# Patient Record
Sex: MALE | Race: WHITE | NOT HISPANIC OR LATINO | Employment: OTHER
[De-identification: names, ages, dates, MRNs, and addresses within clinical notes are randomized per-mention and may not be internally consistent; named-entity substitution may affect disease eponyms.]

---

## 2020-06-30 ENCOUNTER — TRANSCRIBE ORDERS (OUTPATIENT)
Dept: OTHER | Age: 80
End: 2020-06-30

## 2020-06-30 DIAGNOSIS — L23.9 ALLERGIC CONTACT DERMATITIS: Primary | ICD-10-CM

## 2020-07-17 NOTE — TELEPHONE ENCOUNTER
FUTURE VISIT INFORMATION      FUTURE VISIT INFORMATION:    Date: 8.31.20    Time: 2 PM    Location: [facility] All[facility]  REFERRAL INFORMATION:    Referring provider:  Kun    Referring providers clinic:  [facility]    Reason for visit/diagnosis  Allergic contact dermatitis    RECORDS REQUESTED FROM:       Clinic name Comments Records Status Imaging Status   [facility] 6.30.20 Kun, -requested all related notes In process  Requested 7.17.20

## 2020-08-25 ENCOUNTER — TELEPHONE (OUTPATIENT)
Dept: ALLERGY | Facility: CLINIC | Age: 80
End: 2020-08-25

## 2020-08-25 NOTE — TELEPHONE ENCOUNTER
Called and left a detailed message on a private voicemail line to stop all antihistamines from today until appointment.   Anupama Kidd LPN

## 2020-08-31 ENCOUNTER — PRE VISIT (OUTPATIENT)
Dept: ALLERGY | Facility: CLINIC | Age: 80
End: 2020-08-31

## 2020-08-31 ENCOUNTER — OFFICE VISIT (OUTPATIENT)
Dept: ALLERGY | Facility: CLINIC | Age: 80
End: 2020-08-31
Attending: DERMATOLOGY
Payer: COMMERCIAL

## 2020-08-31 DIAGNOSIS — L20.81 ATOPIC NEURODERMATITIS: ICD-10-CM

## 2020-08-31 DIAGNOSIS — L56.8 PHOTODERMATITIS: ICD-10-CM

## 2020-08-31 DIAGNOSIS — L23.89 ALLERGIC CONTACT DERMATITIS DUE TO OTHER AGENTS: Primary | ICD-10-CM

## 2020-08-31 RX ORDER — POLYETHYLENE GLYCOL 3350 17 G/17G
17 POWDER, FOR SOLUTION ORAL
COMMUNITY
Start: 2020-08-24

## 2020-08-31 RX ORDER — CLOBETASOL PROPIONATE 0.5 MG/ML
SOLUTION TOPICAL
COMMUNITY
Start: 2020-04-22

## 2020-08-31 RX ORDER — METOPROLOL SUCCINATE 50 MG/1
50 TABLET, EXTENDED RELEASE ORAL
COMMUNITY
Start: 2020-08-24

## 2020-08-31 RX ORDER — CALCIUM POLYCARBOPHIL 625 MG 625 MG/1
2 TABLET ORAL 2 TIMES DAILY
COMMUNITY

## 2020-08-31 RX ORDER — TERAZOSIN 5 MG/1
10 CAPSULE ORAL
COMMUNITY

## 2020-08-31 RX ORDER — BETAMETHASONE DIPROPIONATE 0.5 MG/G
CREAM TOPICAL
COMMUNITY
Start: 2020-05-05

## 2020-08-31 RX ORDER — PRAVASTATIN SODIUM 10 MG
10 TABLET ORAL
COMMUNITY
Start: 2020-08-24

## 2020-08-31 ASSESSMENT — PAIN SCALES - GENERAL: PAINLEVEL: NO PAIN (0)

## 2020-08-31 NOTE — NURSING NOTE
Chief Complaint   Patient presents with     Allergy Consult     Referred by Flory Tristan for contact dermatitis.     Anupama Kidd LPN

## 2020-08-31 NOTE — PROGRESS NOTES
[facility] Dermato-allergology note      Allergy Problem List:    Specialty Problems     None          CC:   Allergy Consult (Referred by Flory Tristan for contact dermatitis.)        Encounter Date: Aug 31, 2020    History of Present Illness:  I have reviewed the existing informations with patient personally, in Epic and other sources including the nursing intake corresponding to this issue. 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 Consulted Oncologist in [place] and there all normal. Then went to Dr. Tristan and again biopsies = not lymphoma, but one time spongiotic dermatitis.    Tried different creams, but not really improvement = has to use topical steroids daily    Patient has also chest pain and bloating --> cut out alcohol about 6 weeks ago and skin improved    Couple weeks ago back to [place] for about 1 week and 4 days later eczema started again.    Used some time ago Neosporin    Patient has since age of 52 in spring and fall rhinosinusitis.    Medications: Metoprolol, Terazosin, Pravastatin, Istolol eye drops (Glaucoma), Fiber supplement and Mirolax (Diverticulitis),   Rarely takes Ibuprofen or Tylenol    Patient since about 1 month on Tacrolimus and has some effects, but not very good.    Patient got Amoxicillin for the dermatitis in June 2020 and after 2-3 days lips were swelling up. Maybe it was not Amoxicillin, patient takes amoxicillin regularly before going to dentist and had never  problems. Find out exaclty what medication was given in June and maybe remove Amoxicillin from Allergy list.        Past Medical History:   There is no problem list on file for this patient.    No past medical history on file.    Allergy History:     Allergies   Allergen Reactions     Amoxicillin Other (See Comments)     Lips swelling      Brimonidine Itching     Rosuvastatin Muscle Pain (Myalgia)     Timolol Itching       Social History:  The patient is retired. Patient has the following hobbies or non-occupational exposure      reports that he quit smoking about 30 years ago. He does not have any smokeless tobacco history on file.      Family History:  No family history on file.    Medications:  Current Outpatient Medications   Medication Sig Dispense Refill     betamethasone dipropionate (DIPROSONE) 0.05 % external cream APPLY TWICE DAILY TO ALL ITCHY SPOTS.       calcium polycarbophil (FIBERCON) 625 MG tablet Take 2 tablets by mouth 2 times daily       clobetasol (TEMOVATE) 0.05 % external solution APPLY TO SCALP 1 2 TIMES A DAY FOR ITCHY SKIN.       metoprolol succinate ER (TOPROL-XL) 50 MG 24 hr tablet Take 50 mg by mouth       polyethylene glycol (MIRALAX) 17 GM/Dose powder Take 17 g by mouth       pravastatin (PRAVACHOL) 10 MG tablet Take 10 mg by mouth       terazosin (HYTRIN) 5 MG capsule Take 10 mg by mouth             Review of Systems:  -As per HPI  -Constitutional: The patient denies fatigue, fevers, chills, unintended weight loss, and night sweats.  -HEENT: Patient denies nonhealing oral sores.  -Skin: As above in HPI. No additional skin concerns.    Physical exam:  Vitals: There were no vitals taken for this visit.  GEN: This is a well developed, well-nourished male in no acute distress, in a pleasant mood.    Skin: Focused examination of the entire skin within the consultation was performed.   - no eczemas on legs, but over the volar side of the forearms some subacute dermatitis with prurigo-like  nodules.   - subacute dermatitis also decolletage, slight erythema on face  - no eczema on trunk   --> somehow airborne or photodistribution!  Other symptoms related to allergy    -No other lesions of concern on areas examined      Allergy Tests:    Past Allergy Test    Future Allergy Test: 8/31/2020     Order for PATCH TESTS    [] Outpatient  [] Inpatient: [facility]..../ Bed ....      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 DDx allergic contact dermatitis vs atopic dermatitis (nummular dermatitis)  Known previous allergies: none    Standardized panels  [x] Standard panel (40 tests)  [x] Preservatives & Antimicrobials (31 tests)  [x] Emulsifiers & Additives (25 tests)   [x] Perfumes/Flavours & Plants (25 tests)  [] Hairdresser panel (12 tests)  [] Rubber Chemicals (22 tests)  [] Plastics (26 tests)  [] Colorants/Dyes/Food additives (20 tests)  [] Metals (implants/dental) (24 tests)  [] Local anaesthetics/NSAIDs (13 tests)  [] Antibiotics & Antimycotics (14 tests)   [x] Corticosteroids (15 tests)   [x] Photopatch test (62 tests)   [] others: ...      [] Patient's own products: ...    DO NOT test if chemical or biological identity is unknown!     always ask from patient the product information and safety sheets (MSDS)   [x] Atopy screen prick test (Atopic predisposition): with IDT HDM and molds if prick negative         Impression/Plan:    # recurrent dermatitis mostly exposed areas DDx atopic dermatitis with nummular dermatitis vs allergic contact dermatitis vs photoallergic dermatitis    # excluding drug allergy with  lip swelling to Amoxicillin  --> unlikely, because not first day of treatment  --> patient had several treatments with Amoxicillin before without problems?!  --> other medication?? Patient will bring the original medication box      Patient counseling:  Explained the patch tests      Follow-up:  For patch tests    I spent a total of 25 minutes face to face with Freddy Day during today s office visit. Over 50% of this time was spent counseling the patient and/or coordinating care.  Please see Assessment and Plan for details.      Thank you for the opportunity be involved in the care of this patient.     Staff: Anupama Kidd LPN

## 2020-08-31 NOTE — PATIENT INSTRUCTIONS
WHAT IS A SKIN PRICK TEST?   A skin prick test is a simple and reliable diagnostic test used to identify substances ( allergens ) responsible for triggering the symptoms of allergy. The basic skin prick testing panel includes common allergens, such as house dust mites, molds, dog and cat allergens and pollen allergens. We have other more specialized series for various food allergens and sometimes we test your own foods directly on your skin as well. Tests will usually be performed on the skin of the forearm. The skin may develop a red and itchy reaction which can be an indication of an allergy to the tested substance, but will be confirmed by discussion with the allergy specialist     HOW IS A SKIN PRICK TEST PERFORMED?   The skin will be disinfected with alcohol, then marked and numbered with a pen to identify the areas where the specific allergens will be tested.   Afterwards a drop of the allergen solution will be placed on the skin and then the skin gently pricked using the tip of a specially designed prick needle.   With this procedure the most superficial part of the skin will be pierced to allow the test solution to diffuse into the skin and make contact with the reactive immune cells.   After 20 min the area will be examined and evaluated for possible allergic reactions.     WHAT ARE THE POSSIBLE RISKS OF A SKIN PRICK TEST?   If the skin reacts it will develop red, itchy wheals of 5-30mm diameter.   The wheal and itch will usually disappear spontaneously after 1-2 hours.   Sometimes there might be a delayed reactions after days and this has to be reported to the treating allergy specialist (could be another kind of reaction pattern and important piece of information).   Rarely there are more serious generalized allergic reactions observed and therefore you will be under observation of the allergy team during the entire procedure.   There is a small risk for some bleeding and skin infection by the  SPT.    WHAT DO I NEED TO DO BEFORE SKIN PRICK TEST?  Stop all antihistamines for at lease 1 week.  Stop all oral steroids at lease 1 month prior to testing.   Patch Testing Information  What are allergen patch tests?    The test is done to look for skin allergies that may be causing rashes and irritation.    A patch test is a way of identifying whether a substance has caused a delayed reaction with skin inflammation, such as contact eczema or delayed (after days) reactions to drugs.     We will use various types of test compounds, which may include common allergens you may come in contact with in daily life such as preservatives, fragrances or even drugs.     Most of the time we will use standardized, prefabricated test solutions. The choice of the substances and series tested will depend on your history of reactions. Sometimes we will test your own products as well.     In order to avoid severe toxic reactions we need detailed information or safety sheets for each of the test compounds.    The test panels are set up with a small amount of common substances that cause skin allergies. They are taped to your skin with a clear hypoallergenic bandage and reinforced hypoallergenic tape.    The substances are numbered, so it is easy to tell what is causing a skin reaction.  What do I need to do in preparation for the test?    Stop all systemic steroids 1 month prior to the testing.     Stop applying topical steroids to the test area one week prior to the test. It is to use them elsewhere throughout the testing process. If this is not possible then discuss options with the Allergy specialist.    Do not go sunbathing or tanning for one week prior to testing    It is okay to take antihistamines as normal.     Please wear dark colors the week of the test since we will write on you with a dark marker that may transfer and stain clothing or bedding.     Some medications can affect the reactions to allergens during the tests.  Therefore reveal all the medications you take to the allergy team. The doctor will discuss the medications with you before the tests.     Eat how you normally would.    Avoid the following:    You cannot get the test area wet during the entire test period. This means no bathing or swimming the entire test period.    No strenuous exercise that may cause sweating.    Do not scratch the test area, this can cause the allergen to spread and give us false positives.     Avoid exposure to UV irradiation. This means no tanning or UV treatment during the testing period.   What can I expect?    Patch testing is done over three appointments.   o The usual schedule is: Monday (Allergen patches are placed), Wednesday (Patches are removed and skin is examined by the MD), and Friday (Skin is examined by the MD)      If you are allergic, there will be an area of irritation where the test was placed.    Itching or burning at the test site might happen if you are allergic to the allergen.  Do not rub or scratch the test site since this may spread the allergen and possibly cause false positives. If itching or burning is not tolerable please contact the clinic.    The marker we draw on your back with ma take up to 5 weeks to wash off completely. Rubbing alcohol can help speed up this process.     Reactions can occur 1 to 2 weeks after the tests are applied. If this happens please take photos of the area and contact the clinic.  What should I do after the tests are placed?    Keep the area dry. No showering or getting the test area wet from the time we see you at your first visit until after your third appointment. If you get the test area wet you are washing off the test and we could get false negative reactions.    If you notice any of the test patches coming loose put on more tape to re-secure the test.    If the marker that is applied fades you can use a dark pen to mele around the panel sites.    Cover the test area when you are  outside to avoid any sun exposure while the test is in place.     You can remove the tests only if there is a severe reaction (itch, pain, blistering). Please report this to your doctor immediately. If you have to remove the tests please mele the locations of each test field with a grid so we can identify the allergen.  WHAT ARE THE POSSIBLE RISKS OF PATCH TESTS     If you are allergic to a compound tested you will develop a localized skin reaction similar to your previous reaction, this may take days to develop. These reactions include a formation of red, itchy skin lesions that could be about a centimeter with small vesicles or possibly even blisters. The lesions will fade over time, this may take weeks. The test might leave some skin pigmentation for a few months.     In rare cases a localized reaction to patch testing can become generalized.     The tests with your own products might have some risks because they are not standardized and unanticipated reactions could occur. We need as much information as possible to evaluate if your own products are safe to test and under what conditions. This has to be evaluated for each individual product.   Useful Contact Information    To contact your doctor you can either send a Addepar message or call [phone]    Address  o [address] [place] [address], [address]    If you develop any serious or adverse allergic reaction after office hours please seek immediate medical assistance at the nearest clinic, urgent care, or emergency room.

## 2020-10-06 ENCOUNTER — TELEPHONE (OUTPATIENT)
Dept: ALLERGY | Facility: CLINIC | Age: 80
End: 2020-10-06

## 2020-10-06 NOTE — TELEPHONE ENCOUNTER
Referral information     Ordering provider: Kun tesafye    Provider requested:      Reason for referral: patch consult

## 2020-10-13 ENCOUNTER — TELEPHONE (OUTPATIENT)
Dept: ALLERGY | Facility: CLINIC | Age: 80
End: 2020-10-13

## 2020-10-13 NOTE — TELEPHONE ENCOUNTER
STANDARD Series         No Substance 2 days 4 days remarks   1 Juaquin Mix [C] - -    2 Colophony - -    3  2-Mercaptobenzothiazole  - -     4 Methylisothiazolinone - -    5 Carba Mix - -    6 Thiuram Mix [A] - -    7 Bisphenol A Epoxy Resin - -    8 Q-Sztw-Jtfjvhwvvuz-Formaldehyde Resin - -    9 Mercapto Mix [A] - -    10 Black Rubber Mix- PPD [B] - -    11 Potassium Dichromate  -  -    12 Balsam of [place] (Myroxylon Pereirae Resin) - -    13 Nickel Sulphate Hexahydrate - -    14 Mixed Dialkyl Thiourea - -    15 Paraben Mix [B] - -    16 Methyldibromo Glutaronitrile - -    17 Fragrance Mix - -    18 2-Bromo-2-Nitropropane-1,3-Diol (Bronopol) - -    19 Lyral - -    20 Tixocortol-21- Pivalate - -    21 Diazolidiyl Urea (Germall II) - -    22 Methyl Methacrylate - -    23 Cobalt (II) Chloride Hexahydrate - -    24 Fragrance Mix II  - -    25 Compositae Mix - -    26 Benzoyl Peroxide - -    27 Bacitracin - -    28 Formaldehyde - -    29 Methylchloroisothiazolinone / Methylisothiazolinone - -    30 Corticosteroid Mix - -    31 Sodium Lauryl Sulfate - -    32 Lanolin Alcohol - -    33 Turpentine - -    34 Cetylstearylalcohol - -    35 Chlorhexidine Dicluconate - -    36 Budenoside - -    37 Imidazolidinyl Urea  - -    38 Ethyl-2 Cyanoacrylate - -    39 Quaternium 15 (Dowicil 200) - -    40 Decyl Glucoside - -      PERFUMES, FLAVORS & PLANTS          No Substance 2 days 4 days remarks   41 1 Benzyl Salicylate - -    42 2 Benzyl Cinnamate - -    43 3 Di-Limonene (Dipentene) - -    44 4 Cananga Odorata (Ozzy Preciado) (I) - -    45 5 Lichen Acid Mix - -    46 6 Mentha Piperita Oil (Peppermint Oil) - -    47 7 Sesquiterpenelactone mix - -    48 8 Tea Tree Oil, Oxidized - -    49 9 Wood Tar Mix - -    50 10 Abietic Acid - -    51 11 Lavendula Angustifolia Oil (Lavender Oil) - -    52 12 Camphor  - -      Fragrance Mix I      53 13 Oakmoss Absolute - -    54 14 Eugenol - -    55 15 Geraniol - -    56 16 Hydroxycitronellal - -    57  17 Isoeugenol - -    58 18 Cinnamic Aldehyde - -    59 19 Cinnamic Alcohol  - -    60 20 Sorbitane Sesquioleate - -      Fragrance mix II      61 21 Citronellol - -    62 22 Alpha-Hexylcinnamic Aldehyde    - -    63 23 Citral - -    64 24 Farnesol - -    65 25 Coumarin - -      CORTICOSTEROIDS     No Substance 2 days 4 days remarks Allergy  class   66 1 Amcinonide - -  B   67 2 Betametasone-17,21 Dipropionate - -  D1   68 3 Desoximetasone - -  C   69 4 Betamethasone-17-Valerate - -  D1   70 5 Dexamethasone - -  C   71 6 Hydrocortisone - -  A   72 7 Clobetasol-17-Propionate - -  D1   73 8 Dexamethasone-21-Phosphate Disodium Salt - -  C   74 9 Hydrocortisone-17 Butyrate - -  D2   75 10 Prednisolone - -  A   76 11 Mometason Furoate - -  D1   77 12 Triamcinolone Acetonide - -  B   78 13 Methylprednisolone Aceponate - -  D2   79 14 Hydrocortisone-21-Acetate - -  A   80 15 Prednicarbate - -  D2     Group Characteristics of group Generic name Name  cross reactions   A Hydrocortisone   Cloprednole, Fludrocortisone acétate, Hydrocortison acetate, Methylprednisolone, Prednisolone, Tixocortolpivalate Alfacortone, Fucidin H, Dermacalm, Hexacortone, Premandole, Imacort With group D2   B Triamcinolone-acetonide   Budenoside (R-isomer), Amcinonide, Desonide, Fluocinolone acetonide, Triamcinolone acetonide Locapred, Locatop  Synalar, Pevisone, Ken[place]ort -   C Betamethasone (Without Dee)   Betamethasone, Dexamethasone, Flumethasone pivalate, Halomethasone Daivobet, Dexasalyl, Locasalen,   -   D1 Betamethasone-diproprionate   Betamethasone dipropionate, Betamethasone-17-valerate, Clobetasole-propionate, Fluticasone propionate, Mometasone furoate Betnovate, Diprogenta, Diprosalic, Diprosone, Celestoderm, Fucicort,  Cutivate, Axotide, Elocom -   D2 Methylprednisolone-aceponate   Hydrocortisone-aceponate, Hydrocortisone-buteprate, Hydrocortisone-17-butyrate, Methylprednisolone aceponate, Prednicarbate Locoïd, Advantan,  Prednitop With  group A and Budesonide (S-isomer)     EMULSIFIERS & ADDITIVES        No Substance 2 days 4 days remarks   81 1 Polyethylene Glycol-400 - -    82 2 Cocamidopropyl Betaine - -    83 3 Amerchol L101 - -    84 4 Propylene Glycol - -    85 5 Triethanolamine - -    86 6 Sorbitane Sesquiolate - -    87 7 Isopropylmyristate - -    88 8 Polysorbate 80  - -    89 9 Amidoamine   (Stearamidopropyl Dimethylamine) - -    90 10 Oleamidopropyl Dimethylamine - -    91 11 Lauryl Glucoside - -    92 12 Coconut Diethanolamide  - -    93 13 2-Hydroxy-4-Methoxy Benzophenone (Oxybenzone) - -    94 14 Benzophenone-4 (Sulisobenzon) - -    95 15 Propolis - -    96 16 Dexpanthenol - -    97 17 Carboxymethyl Cellulose Sodium - -    98 18 Abitol - -    99 19 Tert-Butylhydroquinone - -    100 20 Benzyl Salicylate - -      Antioxidant      101 21 Dodecyl Gallate - -    102 22 Butylhydroxyanisole (BHA) - -    103 23 Butylhydroxytoluene (BHT) - -    104 24 Di-Alpha-Tocopherol (Vit E) - -    105 25 Propyl Gallate - -    106 26 Zinc Pyrithione      107 27 Dimethylaminopropylamin (DMAPA)          PRESERVATIVES & ANTIMICROBIALS          No Substance 2 days 4 days remarks   108 1  1,2-Benzisothiazoline-3-One, Sodium Salt - -    109 2  1,3,5-Mk (2-Hydroxyethyl) - Hexahydrotriazine (Grotan BK) - -    110 3 7-Esnwrivxrsiqn-7-Nitro-1, 3-Propanediol - -    111 4  3, 4, 4' - Triclocarban - -    112 5 4 - Chloro - 3 - Cresol - -    113 6 4 - Chloro - 4 - Xylenol (PCMX) - -    114 7 7-Ethylbicyclooxazolidine (Bioban TB4809) - -    115 8 Benzalkonium Chloride - -    116 9 Benzyl Alcohol - -    117 10 Cetalkonium Chloride - -    118 11 Cetylpyrimidine Chloride  - -    119 12 Chloroacetamide - -    120 13 DMDM Hydantoin - -    121 14 Glutaraldehyde - -    122 15 Triclosan - -    123 16 Glyoxal Trimeric Dihydrate - -    124 17 Iodopropynyl Butylcarbamate - -    125 18 Octylisothiazoline - -    126 19 Iodoform - -    127 20 (Nitrobutyl)  Morpholine/(Ethylnitro-Trimethylene) Dimorpholine (Bioban P 1487) - -    128 21 Phenoxyethanol - -    129 22 Phenyl Salicylate - -    130 23 Povidone Iodine - -    131 24 Sodium Benzoate - -    132 25 Sodium Disulfite - -    133 26 Sorbic Acid - -    134 27 Thimerosal - -    135 28 Melamine Formeladyde Resin        Parabens      136 29 Butyl-P-Hydroxybenzoate - -    137 30 Ethyl-P-Hydroxybenzoate - -    138 31 Methyl-P-Hydroxybenzoate - -    139 32 Propyl-P-Hydroxybenzoate - -      PHOTOPATCH tests (unexposed)          No Substance 2 days 4 days remarks   140 1 3,4,4'-Triclocarban - -    141 2 Coumarin - -    142 3 Bithiniol - -    143 4 Usnic Acid - -    144 5 Compositae Mix - -    145 6 Wood Tar Mix - -    146 7 Balsam of [place] (Myroxylon Pereirae Resin) - -    147 8 Hexachlorophene - -    148 9 Chlorhexidine Digluconate - -    149 10 Triclosan - -    150 11 Fragrance Mix - -    151 12 Ketoprofen  - -    152 13 Lichen Acid Mix - -    153 14 Musk Ambrette - -      Sunscreens (UV filters)  -    154 15 P-Aminobenzoic Acid - -    155 16 2-Hydroxy-4-Methoxy Benzophenone (Oxybenzone) - -    156 17 Tert-Butyl-Methoxydibenzoyl Methane  - -    157 18 3-4-Mehyl Benzyliden Camphor  - -    158 19 2-Ethylhexyl-P (Dimethylamino) Benzoate - -    159 20 6-Nlczrtpe-3-Methoxy-4-Methyl Benzophenone - -    160 21 Benzyl Salicylate - -    161 22 Isoamyl-4-Methoxycinnamate - -    162 23 Phenyl Benzimidazole - 5 - Sulfonic Acid - -    163 24 7-Asufzagvftoht-7-Hydroxybenzoyl-Benzoic Acid Hexyl Dee - -    164 25 Menthyl Anthranilate (Meradimate) - -    165 26 Bis-Ethylhexyloxyphenol-Methoxy Phenyl Triazine - -    166 27 Diethylhexyl Butamido Triazone - -    167 28 Disodium Phenyldibenzimidazole Tetrasulfonate - -    168 29 Octocrylene - -    169 30 Octyl Triazone - -    170 31 Tinsorb (Methylene - Bis - Benzotriazyl Tetramethylbutylphenol) - -      OTHER PRODUCTS          No Substance Conc  % solv 2 days 4 days remarks   171 1          172 2         172 3         174 4         175 5         176 6         177 7         178 8         179 9         180 10           PHOTOPATCH tests (exposed)          No Substance 2 days 4 days remarks   181 1 3,4,4'-Triclocarban - -    182 2 Coumarin - -    183 3 Bithiniol - -    184 4 Usnic Acid - -    185 5 Compositae Mix - -    186 6 Wood Tar Mix - -    187 7 Balsam of [place] (Myroxylon Pereirae Resin) - -    188 8 Hexachlorophene - -    189 9 Chlorhexidine Digluconate - -    190 10 Triclosan - -    191 11 Fragrance Mix - -    192 12 Ketoprofen  - -    193 13 Lichen Acid Mix - -    194 14 Musk Ambrette - -      Sunscreens (UV filters)  -    195 15 P-Aminobenzoic Acid - -    196 16 2-Hydroxy-4-Methoxy Benzophenone (Oxybenzone) - -    197 17 Tert-Butyl-Methoxydibenzoyl Methane  - -    198 18 3-4-Mehyl Benzyliden Camphor  - -    199 19 2-Ethylhexyl-P (Dimethylamino) Benzoate - -    200 20 7-Cvebwnzc-6-Methoxy-4-Methyl Benzophenone - -    201 21 Benzyl Salicylate - -    202 22 Isoamyl-4-Methoxycinnamate - -    203 23 Phenyl Benzimidazole - 5 - Sulfonic Acid - -    204 24 5-Vrsfslvzxewfe-8-Hydroxybenzoyl-Benzoic Acid Hexyl Dee - -    205 25 Menthyl Anthranilate (Meradimate) - -    206 26 Bis-Ethylhexyloxyphenol-Methoxy Phenyl Triazine - -    207 27 Diethylhexyl Butamido Triazone - -    208 28 Disodium Phenyldibenzimidazole Tetrasulfonate - -    209 29 Octocrylene - -    210 30 Octyl Triazone - -    211 31 Tinsorb (Methylene - Bis - Benzotriazyl Tetramethylbutylphenol) - -      Procedure: Apply the same photopatch series 2  times on the back. Remove one patch side for irradiation after 1 day and then irradiate this site with 10 J/cm2 UVA. Remove the other, non-irradiated patch sites on day 2.   Maybe perform on these patients as well standardized UV-B and UV-A MED tests.    OTHER PRODUCTS (Exposed)          Substance Conc  % solv 2 days 4 days remarks   212         213         214         215         216         217    "      218         219         220           Patients own products:  è DO NOT test if chemical or biological identity is unknown!   - always ask from patient the product information and safety sheets and consult with the physician   - check neutral pH with pH indicator paper (do not test pH below 5 or over 8 or consult with physician)  - leave-on cosmetics can be tested \"as is\"  - rinse-off products have to be diluted with water, buffer, olive oil or paraffin (discuss with physician)  à remember:   - non-specific toxic/corrosive or biological reactions can occur  - tests with patients own products are not standardized and test conditions are not optimized for patch tests. Whenever possible test with standardized test series and correlate use of product with the result of the patch tests  - if not sure if compounds can be tested under occlusion in patch tests consider open application tests    Results of patch tests:                         Interpretation:    - Negative                    A    = Allergic      (+) Erythema    TI   = Toxic/irritant   + E + Infiltration    RaP = Relevance at Present     ++ E/I + Papulovesicle   Rpr  = Relevance Previously     +++ E/I/P + Blister     nR   = No Relevance          Atopy Screen    No Substance Readings (15 min) Evaluation   POS Histamine 1mg/ml -    NEG NaCl 0.9% -      No Substance Readings (15 min) Evaluation   1 Alternaria alternata (tenuis)  -    2 Cladosporium herbarum -    3 Aspergillus fumigatus -    4 Penicillium notatum -    5 Dermatophagoides pteronyssinus -    6 Dermatophagoides farinae -    7 Dog epithelium (canis spp) -    8 Cat hair (alyssa catus) -    9 Cockroach   (Blatella americana & germanica) -    10 Grass mix midwest   (Nano, Orchard, Redtop, Rickie) -    11 Kenrick grass (sorghum halepense) -    12 Weed mix   (common Cocklebur, Lamb s quarters, rough redroot Pigweed, Dock/Sorrel) -    13 Mug wort (artemisia vulgare) -    14 Ragweed giant/short (ambrosia " spp) -    15 English Plantain (plantago lanceolata) -    16 Tree mix 1 (Pecan, Maple BHR, Oak RVW, american [place], black Sharon) -    17 Red cedar (juniperus virginia) -    18 Tree mix 2   (white Tashi, river/red Birch, black Felt, common [place], american Elm) -    19 Box elder/Maple mix (acer spp) -    20 [facility] (carya ovata) -       -      Conclusion:

## 2020-10-16 ENCOUNTER — TELEPHONE (OUTPATIENT)
Dept: DERMATOLOGY | Facility: CLINIC | Age: 80
End: 2020-10-16

## 2020-10-16 NOTE — TELEPHONE ENCOUNTER
"Patient called and was going over pre patch testing info and noticed that he is not suppose to use steroid creams one week before appointment. Patient last used Triamcinolone on 10/15 on upper right corner of back, patient says 3 quarters of his back is \"untouched\". Patient wondering if he should reschedule?     I will forward to Dr. Arevalo and Beverly FORRESTER CMA    "

## 2020-10-19 ENCOUNTER — OFFICE VISIT (OUTPATIENT)
Dept: ALLERGY | Facility: CLINIC | Age: 80
End: 2020-10-19
Payer: COMMERCIAL

## 2020-10-19 DIAGNOSIS — L20.81 ATOPIC NEURODERMATITIS: ICD-10-CM

## 2020-10-19 DIAGNOSIS — L56.8 PHOTODERMATITIS: Primary | ICD-10-CM

## 2020-10-19 DIAGNOSIS — L23.89 ALLERGIC CONTACT DERMATITIS DUE TO OTHER AGENTS: ICD-10-CM

## 2020-10-19 PROCEDURE — 95044 PATCH/APPLICATION TESTS: CPT | Mod: 59 | Performed by: DERMATOLOGY

## 2020-10-19 PROCEDURE — 95052 PHOTO PATCH TESTS: CPT | Performed by: DERMATOLOGY

## 2020-10-19 PROCEDURE — 95004 PERQ TESTS W/ALRGNC XTRCS: CPT | Performed by: DERMATOLOGY

## 2020-10-19 PROCEDURE — 95024 IQ TESTS W/ALLERGENIC XTRCS: CPT | Performed by: DERMATOLOGY

## 2020-10-19 PROCEDURE — 96912 PHOTOCHEMOTHERAPY PUVA: CPT | Performed by: DERMATOLOGY

## 2020-10-19 ASSESSMENT — PAIN SCALES - GENERAL: PAINLEVEL: NO PAIN (0)

## 2020-10-19 NOTE — PROGRESS NOTES
Patient had 165 patch tests placed this visit.  Patient had 30 photo patch tests placed this visit.     Standard panel (40 tests)    Preservatives & Antimicrobials (31 tests)    Emulsifiers & Additives (25 tests)     Perfumes/Flavours & Plants (24 tests)    Corticosteroids (15 tests)     Unexposed Photopatch test (30 tests)     Exposed Photopatch tests (30 tests)

## 2020-10-19 NOTE — PROGRESS NOTES
Note for return visit for Dermato-Allergy       Encounter Date: Oct 19, 2020    History of Present Illness:  Freddy Day is a 80 year old male who presents in person to the consult following information has been take directly from the prior notes, patients informations, Epic and/or other sources and exam/history performed by myself:       Additional comments and observations from review of the patient s chart including the following:    See notes for more details    ROS: Patient generally feeling well today   Physical Examination:  General: Well-appearing, appropriately-developed individual.  - Skin: Focused examination of the skin on trunk and extremities within the consultation was performed.   Generally very dry skin with nummular eczematous lesions on extremities and on neck very dry, almost lichenified skin  On the back some localized eczematous lesions  As above in HPI. No additional skin concerns.  - upper respiratory tract: currently no obvious Rhinitis  - lungs: no signs for active and present Asthma/Wheezing or coughing  - eyes: currently no active conjunctivits  - GI system/digestion: currently no problems, no bloating or Diarrhoea        Earlier History and Allergy exams:    2.5 years ago patient was on a party in [place] and got bitten on the ankle and then the rash did not disappear and then spread over the rest of legs ==> diagnosed as nummular eczema (got steroid injection and topical triamcinolone and within 3months gone)    Last December again in [place] and again insect bite on ankles and then spread over legs, arms, back/chest and face ==> dermatologist in [place] with 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 Consulted Oncologist in [place] and there all normal. Then went to Dr. Tristan and again biopsies = not lymphoma, but one time spongiotic dermatitis.    Tried different creams, but not really improvement = has to use topical steroids daily    Patient has also chest pain and  bloating --> cut out alcohol about 6 weeks ago and skin improved    Couple weeks ago back to [place] for about 1 week and 4 days later eczema started again.    Used some time ago Neosporin    Patient has since age of 52 in spring and fall rhinosinusitis.    Medications: Metoprolol, Terazosin, Pravastatin, Istolol eye drops (Glaucoma), Fiber supplement and Mirolax (Diverticulitis),   Rarely takes Ibuprofen or Tylenol    Patient since about 1 month on Tacrolimus and has some effects, but not very good.    Patient got Amoxicillin for the dermatitis in June 2020 and after 2-3 days lips were swelling up. Maybe it was not Amoxicillin, patient takes amoxicillin regularly before going to dentist and had never problems. Find out exaclty what medication was given in June and maybe remove Amoxicillin from Allergy list.    Past Medical History:   There is no problem list on file for this patient.    No past medical history on file.    Allergy History:     Allergies   Allergen Reactions     Amoxicillin Other (See Comments)     Lips swelling      Brimonidine Itching     Rosuvastatin Muscle Pain (Myalgia)     Timolol Itching       Social History:  The patient is retired. Patient has the following hobbies or non-occupational exposure      reports that he quit smoking about 30 years ago. He does not have any smokeless tobacco history on file.      Family History:  No family history on file.    Medications:  Current Outpatient Medications   Medication Sig Dispense Refill     betamethasone dipropionate (DIPROSONE) 0.05 % external cream APPLY TWICE DAILY TO ALL ITCHY SPOTS.       calcium polycarbophil (FIBERCON) 625 MG tablet Take 2 tablets by mouth 2 times daily       clobetasol (TEMOVATE) 0.05 % external solution APPLY TO SCALP 1 2 TIMES A DAY FOR ITCHY SKIN.       metoprolol succinate ER (TOPROL-XL) 50 MG 24 hr tablet Take 50 mg by mouth       polyethylene glycol (MIRALAX) 17 GM/Dose powder Take 17 g by mouth       pravastatin  (PRAVACHOL) 10 MG tablet Take 10 mg by mouth       terazosin (HYTRIN) 5 MG capsule Take 10 mg by mouth         Allergy Tests:    Past Allergy Test    Future Allergy Test: 8/31/2020     Order for PATCH TESTS    [] Outpatient  [] Inpatient: [facility]..../ Bed ....      Skin Atopy (atopic dermatitis) [x] Yes   [] No  Rhinitis/Sinusitis:   [x] Yes   [] No (spring and fall) --> Sinusitis and Postnasal drip entire year. 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 DDx allergic contact dermatitis vs atopic dermatitis (nummular dermatitis)  Known previous allergies: none    Standardized panels  [x] Standard panel (40 tests)  [x] Preservatives & Antimicrobials (31 tests)  [x] Emulsifiers & Additives (25 tests)   [x] Perfumes/Flavours & Plants (25 tests)  [] Hairdresser panel (12 tests)  [] Rubber Chemicals (22 tests)  [] Plastics (26 tests)  [] Colorants/Dyes/Food additives (20 tests)  [] Metals (implants/dental) (24 tests)  [] Local anaesthetics/NSAIDs (13 tests)  [] Antibiotics & Antimycotics (14 tests)   [x] Corticosteroids (15 tests)   [x] Photopatch test (62 tests)   [] others: ...      [] Patient's own products: ...    DO NOT test if chemical or biological identity is unknown!     always ask from patient the product information and safety sheets (MSDS)   [x] Atopy screen prick test (Atopic predisposition): with IDT HDM and molds if prick negative    RESULTS & EVALUATION of PATCH TESTS    Patch test readings after     [x] 2 days, [] 3 days [x] 4 days, [] 5 days,   Other duration: ...    Applied patch tests with results (import here the list of patch tests):     STANDARD Series         No  Substance 2 days 4 days remarks   1 Juaquin Mix [C] - -    2 Colophony - -    3  2-Mercaptobenzothiazole  - -     4 Methylisothiazolinone - -    5 Carba Mix - -    6 Thiuram Mix [A] - -    7 Bisphenol A Epoxy Resin - -    8 O-Nmcr-Wgvonjpsjzu-Formaldehyde Resin - -    9 Mercapto Mix [A] - -    10 Black Rubber Mix- PPD [B] - -    11 Potassium Dichromate  -  -    12 Balsam of [place] (Myroxylon Pereirae Resin) - -    13 Nickel Sulphate Hexahydrate - -    14 Mixed Dialkyl Thiourea - -    15 Paraben Mix [B] - -    16 Methyldibromo Glutaronitrile - -    17 Fragrance Mix - -    18 2-Bromo-2-Nitropropane-1,3-Diol (Bronopol) - -    19 Lyral - -    20 Tixocortol-21- Pivalate - -    21 Diazolidiyl Urea (Germall II) - -    22 Methyl Methacrylate - -    23 Cobalt (II) Chloride Hexahydrate - -    24 Fragrance Mix II  - -    25 Compositae Mix - -    26 Benzoyl Peroxide - -    27 Bacitracin - -    28 Formaldehyde - -    29 Methylchloroisothiazolinone / Methylisothiazolinone - -    30 Corticosteroid Mix - -    31 Sodium Lauryl Sulfate - -    32 Lanolin Alcohol - -    33 Turpentine - -    34 Cetylstearylalcohol - -    35 Chlorhexidine Dicluconate - -    36 Budenoside - -    37 Imidazolidinyl Urea  - -    38 Ethyl-2 Cyanoacrylate - -    39 Quaternium 15 (Dowicil 200) - -    40 Decyl Glucoside - -      PERFUMES, FLAVORS & PLANTS          No Substance 2 days 4 days remarks   41 1 Benzyl Salicylate - -    42 2 Benzyl Cinnamate - -    43 3 Di-Limonene (Dipentene) - -    44 4 Cananga Odorata (Ylang Ylang) (I) - -    45 5 Lichen Acid Mix - -    46 6 Mentha Piperita Oil (Peppermint Oil) - -    47 7 Sesquiterpenelactone mix - -    48 8 Tea Tree Oil, Oxidized - -    49 9 Wood Tar Mix - -    50 10 Abietic Acid - -    51 11 Lavendula Angustifolia Oil (Lavender Oil) - -    52 12 Camphor  NA NA      Fragrance Mix I      53 13 Oakmoss Absolute - -    54 14 Eugenol - -    55 15 Geraniol - -    56 16 Hydroxycitronellal - -    57 17 Isoeugenol - -    58  18 Cinnamic Aldehyde - -    59 19 Cinnamic Alcohol  - -    60 20 Sorbitane Sesquioleate - -      Fragrance mix II      61 21 Citronellol - -    62 22 Alpha-Hexylcinnamic Aldehyde    - -    63 23 Citral - -    64 24 Farnesol - -    65 25 Coumarin - -      CORTICOSTEROIDS     No Substance 2 days 4 days remarks Allergy  class   66 1 Amcinonide - -  B   67 2 Betametasone-17,21 Dipropionate - -  D1   68 3 Desoximetasone - -  C   69 4 Betamethasone-17-Valerate - -  D1   70 5 Dexamethasone - -  C   71 6 Hydrocortisone - -  A   72 7 Clobetasol-17-Propionate - -  D1   73 8 Dexamethasone-21-Phosphate Disodium Salt - -  C   74 9 Hydrocortisone-17 Butyrate - -  D2   75 10 Prednisolone - -  A   76 11 Mometason Furoate - -  D1   77 12 Triamcinolone Acetonide - -  B   78 13 Methylprednisolone Aceponate - -  D2   79 14 Hydrocortisone-21-Acetate - -  A   80 15 Prednicarbate - -  D2     Group Characteristics of group Generic name Name  cross reactions   A Hydrocortisone   Cloprednole, Fludrocortisone acétate, Hydrocortison acetate, Methylprednisolone, Prednisolone, Tixocortolpivalate Alfacortone, Fucidin H, Dermacalm, Hexacortone, Premandole, Imacort With group D2   B Triamcinolone-acetonide   Budenoside (R-isomer), Amcinonide, Desonide, Fluocinolone acetonide, Triamcinolone acetonide Locapred, Locatop  Synalar, Pevisone, [place] -   C Betamethasone (Without Dee)   Betamethasone, Dexamethasone, Flumethasone pivalate, Halomethasone Daivobet, Dexasalyl, Locasalen,   -   D1 Betamethasone-diproprionate   Betamethasone dipropionate, Betamethasone-17-valerate, Clobetasole-propionate, Fluticasone propionate, Mometasone furoate Betnovate, Diprogenta, Diprosalic, Diprosone, Celestoderm, Fucicort,  Cutivate, Axotide, Elocom -   D2 Methylprednisolone-aceponate   Hydrocortisone-aceponate, Hydrocortisone-buteprate, Hydrocortisone-17-butyrate, Methylprednisolone aceponate, Prednicarbate Locoïd, Advantan,  Prednitop With group A and Budesonide  (S-isomer)     EMULSIFIERS & ADDITIVES        No Substance 2 days 4 days remarks   81 1 Polyethylene Glycol-400 - -    82 2 Cocamidopropyl Betaine - -    83 3 Amerchol L101 - -    84 4 Propylene Glycol - -    85 5 Triethanolamine - -    86 6 Sorbitane Sesquiolate - -    87 7 Isopropylmyristate - -    88 8 Polysorbate 80  - -    89 9 Amidoamine   (Stearamidopropyl Dimethylamine) - -    90 10 Oleamidopropyl Dimethylamine - -    91 11 Lauryl Glucoside - -    92 12 Coconut Diethanolamide  - -    93 13 2-Hydroxy-4-Methoxy Benzophenone (Oxybenzone) - -    94 14 Benzophenone-4 (Sulisobenzon) - -    95 15 Propolis - -    96 16 Dexpanthenol - -    97 17 Carboxymethyl Cellulose Sodium NA NA    98 18 Abitol - -    99 19 Tert-Butylhydroquinone - -    100 20 Benzyl Salicylate - -      Antioxidant      101 21 Dodecyl Gallate - -    102 22 Butylhydroxyanisole (BHA) - -    103 23 Butylhydroxytoluene (BHT) - -    104 24 Di-Alpha-Tocopherol (Vit E) - -    105 25 Propyl Gallate - -    106 26 Zinc Pyrithione NA NA    107 27 Dimethylaminopropylamin (DMAPA) - -        PRESERVATIVES & ANTIMICROBIALS          No Substance 2 days 4 days remarks   108 1  1,2-Benzisothiazoline-3-One, Sodium Salt - -    109 2  1,3,5-Mk (2-Hydroxyethyl) - Hexahydrotriazine (Grotan BK) - -    110 3 5-Rjpmpvbgiociq-6-Nitro-1, 3-Propanediol - -    111 4  3, 4, 4' - Triclocarban - -    112 5 4 - Chloro - 3 - Cresol - -    113 6 4 - Chloro - 4 - Xylenol (PCMX) - -    114 7 7-Ethylbicyclooxazolidine (Bioban [id number]) - -    115 8 Benzalkonium Chloride - -    116 9 Benzyl Alcohol - -    117 10 Cetalkonium Chloride - -    118 11 Cetylpyrimidine Chloride  - -    119 12 Chloroacetamide - -    120 13 DMDM Hydantoin - -    121 14 Glutaraldehyde - -    122 15 Triclosan - -    123 16 Glyoxal Trimeric Dihydrate - -    124 17 Iodopropynyl Butylcarbamate - -    125 18 Octylisothiazoline - -    126 19 Iodoform NA NA    127 20 (Nitrobutyl) Morpholine/(Ethylnitro-Trimethylene)  Dimorpholine (Bioban P 1487) - -    128 21 Phenoxyethanol - -    129 22 Phenyl Salicylate - -    130 23 Povidone Iodine - -    131 24 Sodium Benzoate - -    132 25 Sodium Disulfite - -    133 26 Sorbic Acid - -    134 27 Thimerosal - -    135 28 Melamine Formeladyde Resin        Parabens      136 29 Butyl-P-Hydroxybenzoate - -    137 30 Ethyl-P-Hydroxybenzoate - -    138 31 Methyl-P-Hydroxybenzoate - -    139 32 Propyl-P-Hydroxybenzoate - -      PHOTOPATCH tests (unexposed)          No Substance 2 days 4 days remarks   140 1 3,4,4'-Triclocarban - -    141 2 Coumarin - -    142 3 Bithiniol NA NA    143 4 Usnic Acid - -    144 5 Compositae Mix - -    145 6 Wood Tar Mix - -    146 7 Balsam of [place] (Myroxylon Pereirae Resin) - -    147 8 Hexachlorophene - -    148 9 Chlorhexidine Digluconate - -    149 10 Triclosan - -    150 11 Fragrance Mix - -    151 12 Ketoprofen  - -    152 13 Lichen Acid Mix - -    153 14 Musk Ambrette - -      Sunscreens (UV filters)  -    154 15 P-Aminobenzoic Acid - -    155 16 2-Hydroxy-4-Methoxy Benzophenone (Oxybenzone) - -    156 17 Tert-Butyl-Methoxydibenzoyl Methane  - -    157 18 3-4-Mehyl Benzyliden Camphor  - -    158 19 2-Ethylhexyl-P (Dimethylamino) Benzoate - -    159 20 5-Jdyhzhfw-8-Methoxy-4-Methyl Benzophenone - -    160 21 Benzyl Salicylate - -    161 22 Isoamyl-4-Methoxycinnamate - -    162 23 Phenyl Benzimidazole - 5 - Sulfonic Acid - -    163 24 6-Kswztpbnbkami-3-Hydroxybenzoyl-Benzoic Acid Hexyl Dee - -    164 25 Menthyl Anthranilate (Meradimate) - -    165 26 Bis-Ethylhexyloxyphenol-Methoxy Phenyl Triazine - -    166 27 Diethylhexyl Butamido Triazone - -    167 28 Disodium Phenyldibenzimidazole Tetrasulfonate - -    168 29 Octocrylene - -    169 30 Octyl Triazone - -    170 31 Tinsorb (Methylene - Bis - Benzotriazyl Tetramethylbutylphenol) - -      PHOTOPATCH tests (exposed)          No Substance 2 days 4 days remarks   181 1 3,4,4'-Triclocarban - -    182 2 Coumarin - -     183 3 Bithiniol NA AN    184 4 Usnic Acid - -    185 5 Compositae Mix - -    186 6 Wood Tar Mix - -    187 7 Balsam of [place] (Myroxylon Pereirae Resin) - -    188 8 Hexachlorophene - -    189 9 Chlorhexidine Digluconate - -    190 10 Triclosan - -    191 11 Fragrance Mix - -    192 12 Ketoprofen  - -    193 13 Lichen Acid Mix - -    194 14 Musk Ambrette - -      Sunscreens (UV filters)  -    195 15 P-Aminobenzoic Acid - -    196 16 2-Hydroxy-4-Methoxy Benzophenone (Oxybenzone) - -    197 17 Tert-Butyl-Methoxydibenzoyl Methane  - -    198 18 3-4-Mehyl Benzyliden Camphor  - -    199 19 2-Ethylhexyl-P (Dimethylamino) Benzoate - -    200 20 6-Altywtqb-5-Methoxy-4-Methyl Benzophenone - -    201 21 Benzyl Salicylate - -    202 22 Isoamyl-4-Methoxycinnamate - -    203 23 Phenyl Benzimidazole - 5 - Sulfonic Acid - -    204 24 2-Mdbkghvvhaoed-4-Hydroxybenzoyl-Benzoic Acid Hexyl Dee - -    205 25 Menthyl Anthranilate (Meradimate) - -    206 26 Bis-Ethylhexyloxyphenol-Methoxy Phenyl Triazine - -    207 27 Diethylhexyl Butamido Triazone - -    208 28 Disodium Phenyldibenzimidazole Tetrasulfonate - -    209 29 Octocrylene - -    210 30 Octyl Triazone - -    211 31 Tinsorb (Methylene - Bis - Benzotriazyl Tetramethylbutylphenol) - -      Procedure: Apply the same photopatch series 2  times on the back. Remove one patch side for irradiation after 1 day and then irradiate this site with 10 J/cm2 UVA. Remove the other, non-irradiated patch sites on day 2.   Maybe perform on these patients as well standardized UV-B and UV-A MED tests.    Results of patch tests:                         Interpretation:    - Negative                    A    = Allergic      (+) Erythema    TI   = Toxic/irritant   + E + Infiltration    RaP = Relevance at Present     ++ E/I + Papulovesicle   Rpr  = Relevance Previously     +++ E/I/P + Blister     nR   = No Relevance          Atopy Screen    No Substance Readings (15 min) Evaluation   POS Histamine 1mg/ml  +/++    NEG NaCl 0.9% -      No Substance Readings (15 min) Evaluation   1 Alternaria alternata (tenuis)  -    2 Cladosporium herbarum -    3 Aspergillus fumigatus -    4 Penicillium notatum -    5 Dermatophagoides pteronyssinus -    6 Dermatophagoides farinae -    7 Dog epithelium (canis spp) -    8 Cat hair (alyssa catus) -    9 Cockroach   (Blatella americana & germanica) -    10 Grass mix midwest   (Nano, Orchard, Redtop, Rickie) -    11 Kenrick grass (sorghum halepense) -    12 Weed mix   (common Cocklebur, Lamb s quarters, rough redroot Pigweed, Dock/Sorrel) -    13 Mug wort (artemisia vulgare) -    14 Ragweed giant/short (ambrosia spp) -    15 English Plantain (plantago lanceolata) -    16 Tree mix 1 (Pecan, Maple BHR, Oak RVW, american [place], black [place]) -    17 Red cedar (juniperus virginia) -    18 Tree mix 2   (white Tashi, river/red Birch, black [place], common [place], american Elm) -    19 Box elder/Maple mix (acer spp) -    20 Sutter shagbark (carya ovata) -       -      Conclusion: no signs for atopic predisposition in prick test      Intradermal Testing (Placed 10/19/20 )    No Substance Conc.  Readings (15min) Evaluation   - NaCl  0.9% - 3mmP, no E   + Histamine (prick) 0.1mg / ml -    DF Standard Dust Mite - D. Farinae 1:10 ++ 10mmP&E   DP Standard Dust Mite - D. Pteronyssinus 1:10 ++ 12mmP&E   A Aspergillus fumigatus  1:10 -    P Penicillium notatum 1:10 -      Conclusion: immediate type reaction to house dust mites in IDT, not to molds      [] No relevant allergic reaction observed    [] Allergic reaction diagnosed against following allergens:      Interpretation/ remarks:   See later    [] Patient information given   [] ACDS CAMP information's (# ....) to following compounds: .....   [] General information's to following compounds: ......    ==> final Diagnosis:    # recurrent dermatitis mostly exposed areas DDx atopic dermatitis with nummular dermatitis vs allergic contact dermatitis vs  photoallergic dermatitis    Immediate type reaction to house dust mites in IDT = atopic    # excluding drug allergy with lip swelling to Amoxicillin  --> unlikely, because not first day of treatment  --> patient had several treatments with Amoxicillin before without problems?!  --> other medication?? Patient will bring the original medication box        These conclusions are made at the best of one's knowledge and belief based on the provided evidence such as patient's history and allergy test results and they can change over time or can be incomplete because of missing information's.    ==> Treatment prescribed/Plan:        Patient counseling:      Follow-up:  For 1st readings and UV irradiation on Wednesday and 2nd readings on Friday      Staff:  Beverly Hines & Anupama Kidd LPN      I spent a total of 23 minutes face to face with Freddy Day during today s office visit. Over 50% of this time was spent discussing all the individual test results, correlating them to the clinical relevance, counseling the patient and/or coordinating care. Please see Assessment and Plan for details.  This excludes any time spent performing prick, intradermal, patch and photopatch tests

## 2020-10-19 NOTE — LETTER
10/19/2020         RE: Freddy Day  [address] [facility] [address]        Dear Colleague,    Thank you for referring your patient, Freddy Day, to the [facility]. Please see a copy of my visit note below.    Note for return visit for Dermato-Allergy       Encounter Date: Oct 19, 2020    History of Present Illness:  Freddy Day is a 80 year old male who presents in person to the consult following information has been take directly from the prior notes, patients informations, Epic and/or other sources and exam/history performed by myself:       Additional comments and observations from review of the patient s chart including the following:    See notes for more details    ROS: Patient generally feeling well today   Physical Examination:  General: Well-appearing, appropriately-developed individual.  - Skin: Focused examination of the skin on trunk and extremities within the consultation was performed.   Generally very dry skin with nummular eczematous lesions on extremities and on neck very dry, almost lichenified skin  On the back some localized eczematous lesions  As above in HPI. No additional skin concerns.  - upper respiratory tract: currently no obvious Rhinitis  - lungs: no signs for active and present Asthma/Wheezing or coughing  - eyes: currently no active conjunctivits  - GI system/digestion: currently no problems, no bloating or Diarrhoea        Earlier History and Allergy exams:    2.5 years ago patient was on a party in [place] and got bitten on the ankle and then the rash did not disappear and then spread over the rest of legs ==> diagnosed as nummular eczema (got steroid injection and topical triamcinolone and within 3months gone)    Last December again in [place] and again insect bite on ankles and then spread over legs, arms, back/chest and face ==> dermatologist in [place] with 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 Consulted  Oncologist in [place] and there all normal. Then went to Dr. Tristan and again biopsies = not lymphoma, but one time spongiotic dermatitis.    Tried different creams, but not really improvement = has to use topical steroids daily    Patient has also chest pain and bloating --> cut out alcohol about 6 weeks ago and skin improved    Couple weeks ago back to [place] for about 1 week and 4 days later eczema started again.    Used some time ago Neosporin    Patient has since age of 52 in spring and fall rhinosinusitis.    Medications: Metoprolol, Terazosin, Pravastatin, Istolol eye drops (Glaucoma), Fiber supplement and Mirolax (Diverticulitis),   Rarely takes Ibuprofen or Tylenol    Patient since about 1 month on Tacrolimus and has some effects, but not very good.    Patient got Amoxicillin for the dermatitis in June 2020 and after 2-3 days lips were swelling up. Maybe it was not Amoxicillin, patient takes amoxicillin regularly before going to dentist and had never problems. Find out exaclty what medication was given in June and maybe remove Amoxicillin from Allergy list.    Past Medical History:   There is no problem list on file for this patient.    No past medical history on file.    Allergy History:     Allergies   Allergen Reactions     Amoxicillin Other (See Comments)     Lips swelling      Brimonidine Itching     Rosuvastatin Muscle Pain (Myalgia)     Timolol Itching       Social History:  The patient is retired. Patient has the following hobbies or non-occupational exposure      reports that he quit smoking about 30 years ago. He does not have any smokeless tobacco history on file.      Family History:  No family history on file.    Medications:  Current Outpatient Medications   Medication Sig Dispense Refill     betamethasone dipropionate (DIPROSONE) 0.05 % external cream APPLY TWICE DAILY TO ALL ITCHY SPOTS.       calcium polycarbophil (FIBERCON) 625 MG tablet Take 2 tablets by mouth 2 times daily        clobetasol (TEMOVATE) 0.05 % external solution APPLY TO SCALP 1 2 TIMES A DAY FOR ITCHY SKIN.       metoprolol succinate ER (TOPROL-XL) 50 MG 24 hr tablet Take 50 mg by mouth       polyethylene glycol (MIRALAX) 17 GM/Dose powder Take 17 g by mouth       pravastatin (PRAVACHOL) 10 MG tablet Take 10 mg by mouth       terazosin (HYTRIN) 5 MG capsule Take 10 mg by mouth         Allergy Tests:    Past Allergy Test    Future Allergy Test: 8/31/2020     Order for PATCH TESTS    [] Outpatient  [] Inpatient: [place]..../ Bed ....      Skin Atopy (atopic dermatitis) [x] Yes   [] No  Rhinitis/Sinusitis:   [x] Yes   [] No (spring and fall) --> Sinusitis and Postnasal drip entire year. 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 DDx allergic contact dermatitis vs atopic dermatitis (nummular dermatitis)  Known previous allergies: none    Standardized panels  [x] Standard panel (40 tests)  [x] Preservatives & Antimicrobials (31 tests)  [x] Emulsifiers & Additives (25 tests)   [x] Perfumes/Flavours & Plants (25 tests)  [] Hairdresser panel (12 tests)  [] Rubber Chemicals (22 tests)  [] Plastics (26 tests)  [] Colorants/Dyes/Food additives (20 tests)  [] Metals (implants/dental) (24 tests)  [] Local anaesthetics/NSAIDs (13 tests)  [] Antibiotics & Antimycotics (14 tests)   [x] Corticosteroids (15 tests)   [x] Photopatch test (62 tests)   [] others: ...      [] Patient's own products: ...    DO NOT test if chemical or biological identity is unknown!     always ask from patient the product information and safety sheets (MSDS)   [x] Atopy screen prick test (Atopic predisposition): with IDT HDM and molds if  prick negative    RESULTS & EVALUATION of PATCH TESTS    Patch test readings after     [x] 2 days, [] 3 days [x] 4 days, [] 5 days,   Other duration: ...    Applied patch tests with results (import here the list of patch tests):     STANDARD Series         No Substance 2 days 4 days remarks   1 Juaquin Mix [C] - -    2 Colophony - -    3  2-Mercaptobenzothiazole  - -     4 Methylisothiazolinone - -    5 Carba Mix - -    6 Thiuram Mix [A] - -    7 Bisphenol A Epoxy Resin - -    8 Q-Gjqw-Hkmxuztvpmg-Formaldehyde Resin - -    9 Mercapto Mix [A] - -    10 Black Rubber Mix- PPD [B] - -    11 Potassium Dichromate  -  -    12 Balsam of [place] (Myroxylon Pereirae Resin) - -    13 Nickel Sulphate Hexahydrate - -    14 Mixed Dialkyl Thiourea - -    15 Paraben Mix [B] - -    16 Methyldibromo Glutaronitrile - -    17 Fragrance Mix - -    18 2-Bromo-2-Nitropropane-1,3-Diol (Bronopol) - -    19 Lyral - -    20 Tixocortol-21- Pivalate - -    21 Diazolidiyl Urea (Germall II) - -    22 Methyl Methacrylate - -    23 Cobalt (II) Chloride Hexahydrate - -    24 Fragrance Mix II  - -    25 Compositae Mix - -    26 Benzoyl Peroxide - -    27 Bacitracin - -    28 Formaldehyde - -    29 Methylchloroisothiazolinone / Methylisothiazolinone - -    30 Corticosteroid Mix - -    31 Sodium Lauryl Sulfate - -    32 Lanolin Alcohol - -    33 Turpentine - -    34 Cetylstearylalcohol - -    35 Chlorhexidine Dicluconate - -    36 Budenoside - -    37 Imidazolidinyl Urea  - -    38 Ethyl-2 Cyanoacrylate - -    39 Quaternium 15 (Dowicil 200) - -    40 Decyl Glucoside - -      PERFUMES, FLAVORS & PLANTS          No Substance 2 days 4 days remarks   41 1 Benzyl Salicylate - -    42 2 Benzyl Cinnamate - -    43 3 Di-Limonene (Dipentene) - -    44 4 Cananga Odorata (Ozzy Preciado) (I) - -    45 5 Lichen Acid Mix - -    46 6 Mentha Piperita Oil (Peppermint Oil) - -    47 7 Sesquiterpenelactone mix - -    48 8 Tea Tree Oil, Oxidized - -    49 9 Wood Tar Mix - -     50 10 Abietic Acid - -    51 11 Lavendula Angustifolia Oil (Lavender Oil) - -    52 12 Camphor  NA NA      Fragrance Mix I      53 13 Oakmoss Absolute - -    54 14 Eugenol - -    55 15 Geraniol - -    56 16 Hydroxycitronellal - -    57 17 Isoeugenol - -    58 18 Cinnamic Aldehyde - -    59 19 Cinnamic Alcohol  - -    60 20 Sorbitane Sesquioleate - -      Fragrance mix II      61 21 Citronellol - -    62 22 Alpha-Hexylcinnamic Aldehyde    - -    63 23 Citral - -    64 24 Farnesol - -    65 25 Coumarin - -      CORTICOSTEROIDS     No Substance 2 days 4 days remarks Allergy  class   66 1 Amcinonide - -  B   67 2 Betametasone-17,21 Dipropionate - -  D1   68 3 Desoximetasone - -  C   69 4 Betamethasone-17-Valerate - -  D1   70 5 Dexamethasone - -  C   71 6 Hydrocortisone - -  A   72 7 Clobetasol-17-Propionate - -  D1   73 8 Dexamethasone-21-Phosphate Disodium Salt - -  C   74 9 Hydrocortisone-17 Butyrate - -  D2   75 10 Prednisolone - -  A   76 11 Mometason Furoate - -  D1   77 12 Triamcinolone Acetonide - -  B   78 13 Methylprednisolone Aceponate - -  D2   79 14 Hydrocortisone-21-Acetate - -  A   80 15 Prednicarbate - -  D2     Group Characteristics of group Generic name Name  cross reactions   A Hydrocortisone   Cloprednole, Fludrocortisone acétate, Hydrocortison acetate, Methylprednisolone, Prednisolone, Tixocortolpivalate Alfacortone, Fucidin H, Dermacalm, Hexacortone, Premandole, Imacort With group D2   B Triamcinolone-acetonide   Budenoside (R-isomer), Amcinonide, Desonide, Fluocinolone acetonide, Triamcinolone acetonide Locapred, Locatop  Synalar, Pevisone, Kenacort -   C Betamethasone (Without Dee)   Betamethasone, Dexamethasone, Flumethasone pivalate, Halomethasone Daivobet, Dexasalyl, Locasalen,   -   D1 Betamethasone-diproprionate   Betamethasone dipropionate, Betamethasone-17-valerate, Clobetasole-propionate, Fluticasone propionate, Mometasone furoate Betnovate, Diprogenta, Diprosalic, Diprosone,  Celestoderm, Fucicort,  Cutivate, Axotide, Elocom -   D2 Methylprednisolone-aceponate   Hydrocortisone-aceponate, Hydrocortisone-buteprate, Hydrocortisone-17-butyrate, Methylprednisolone aceponate, Prednicarbate Locoïd, Advantan,  Prednitop With group A and Budesonide (S-isomer)     EMULSIFIERS & ADDITIVES        No Substance 2 days 4 days remarks   81 1 Polyethylene Glycol-400 - -    82 2 Cocamidopropyl Betaine - -    83 3 Amerchol L101 - -    84 4 Propylene Glycol - -    85 5 Triethanolamine - -    86 6 Sorbitane Sesquiolate - -    87 7 Isopropylmyristate - -    88 8 Polysorbate 80  - -    89 9 Amidoamine   (Stearamidopropyl Dimethylamine) - -    90 10 Oleamidopropyl Dimethylamine - -    91 11 Lauryl Glucoside - -    92 12 Coconut Diethanolamide  - -    93 13 2-Hydroxy-4-Methoxy Benzophenone (Oxybenzone) - -    94 14 Benzophenone-4 (Sulisobenzon) - -    95 15 Propolis - -    96 16 Dexpanthenol - -    97 17 Carboxymethyl Cellulose Sodium NA NA    98 18 Abitol - -    99 19 Tert-Butylhydroquinone - -    100 20 Benzyl Salicylate - -      Antioxidant      101 21 Dodecyl Gallate - -    102 22 Butylhydroxyanisole (BHA) - -    103 23 Butylhydroxytoluene (BHT) - -    104 24 Di-Alpha-Tocopherol (Vit E) - -    105 25 Propyl Gallate - -    106 26 Zinc Pyrithione NA NA    107 27 Dimethylaminopropylamin (DMAPA) - -        PRESERVATIVES & ANTIMICROBIALS          No Substance 2 days 4 days remarks   108 1  1,2-Benzisothiazoline-3-One, Sodium Salt - -    109 2  1,3,5-Mk (2-Hydroxyethyl) - Hexahydrotriazine (Grotan BK) - -    110 3 3-Sizwpmztbsnfj-6-Nitro-1, 3-Propanediol - -    111 4  3, 4, 4' - Triclocarban - -    112 5 4 - Chloro - 3 - Cresol - -    113 6 4 - Chloro - 4 - Xylenol (PCMX) - -    114 7 7-Ethylbicyclooxazolidine (Bioban [id number]) - -    115 8 Benzalkonium Chloride - -    116 9 Benzyl Alcohol - -    117 10 Cetalkonium Chloride - -    118 11 Cetylpyrimidine Chloride  - -    119 12 Chloroacetamide - -    120 13 DMDM  Hydantoin - -    121 14 Glutaraldehyde - -    122 15 Triclosan - -    123 16 Glyoxal Trimeric Dihydrate - -    124 17 Iodopropynyl Butylcarbamate - -    125 18 Octylisothiazoline - -    126 19 Iodoform NA NA    127 20 (Nitrobutyl) Morpholine/(Ethylnitro-Trimethylene) Dimorpholine (Bioban P 1487) - -    128 21 Phenoxyethanol - -    129 22 Phenyl Salicylate - -    130 23 Povidone Iodine - -    131 24 Sodium Benzoate - -    132 25 Sodium Disulfite - -    133 26 Sorbic Acid - -    134 27 Thimerosal - -    135 28 Melamine Formeladyde Resin        Parabens      136 29 Butyl-P-Hydroxybenzoate - -    137 30 Ethyl-P-Hydroxybenzoate - -    138 31 Methyl-P-Hydroxybenzoate - -    139 32 Propyl-P-Hydroxybenzoate - -      PHOTOPATCH tests (unexposed)          No Substance 2 days 4 days remarks   140 1 3,4,4'-Triclocarban - -    141 2 Coumarin - -    142 3 Bithiniol NA NA    143 4 Usnic Acid - -    144 5 Compositae Mix - -    145 6 Wood Tar Mix - -    146 7 Balsam of [place] (Myroxylon Pereirae Resin) - -    147 8 Hexachlorophene - -    148 9 Chlorhexidine Digluconate - -    149 10 Triclosan - -    150 11 Fragrance Mix - -    151 12 Ketoprofen  - -    152 13 Lichen Acid Mix - -    153 14 Musk Ambrette - -      Sunscreens (UV filters)  -    154 15 P-Aminobenzoic Acid - -    155 16 2-Hydroxy-4-Methoxy Benzophenone (Oxybenzone) - -    156 17 Tert-Butyl-Methoxydibenzoyl Methane  - -    157 18 3-4-Mehyl Benzyliden Camphor  - -    158 19 2-Ethylhexyl-P (Dimethylamino) Benzoate - -    159 20 6-Fylbpvkn-7-Methoxy-4-Methyl Benzophenone - -    160 21 Benzyl Salicylate - -    161 22 Isoamyl-4-Methoxycinnamate - -    162 23 Phenyl Benzimidazole - 5 - Sulfonic Acid - -    163 24 5-Vnzuvcdkatxpx-2-Hydroxybenzoyl-Benzoic Acid Hexyl Dee - -    164 25 Menthyl Anthranilate (Meradimate) - -    165 26 Bis-Ethylhexyloxyphenol-Methoxy Phenyl Triazine - -    166 27 Diethylhexyl Butamido Triazone - -    167 28 Disodium Phenyldibenzimidazole Tetrasulfonate  - -    168 29 Octocrylene - -    169 30 Octyl Triazone - -    170 31 Tinsorb (Methylene - Bis - Benzotriazyl Tetramethylbutylphenol) - -      PHOTOPATCH tests (exposed)          No Substance 2 days 4 days remarks   181 1 3,4,4'-Triclocarban - -    182 2 Coumarin - -    183 3 Bithiniol NA AN    184 4 Usnic Acid - -    185 5 Compositae Mix - -    186 6 Wood Tar Mix - -    187 7 Balsam of [place] (Myroxylon Pereirae Resin) - -    188 8 Hexachlorophene - -    189 9 Chlorhexidine Digluconate - -    190 10 Triclosan - -    191 11 Fragrance Mix - -    192 12 Ketoprofen  - -    193 13 Lichen Acid Mix - -    194 14 Musk Ambrette - -      Sunscreens (UV filters)  -    195 15 P-Aminobenzoic Acid - -    196 16 2-Hydroxy-4-Methoxy Benzophenone (Oxybenzone) - -    197 17 Tert-Butyl-Methoxydibenzoyl Methane  - -    198 18 3-4-Mehyl Benzyliden Camphor  - -    199 19 2-Ethylhexyl-P (Dimethylamino) Benzoate - -    200 20 0-Knuhklzz-5-Methoxy-4-Methyl Benzophenone - -    201 21 Benzyl Salicylate - -    202 22 Isoamyl-4-Methoxycinnamate - -    203 23 Phenyl Benzimidazole - 5 - Sulfonic Acid - -    204 24 1-Mpmwmclxzfzbz-7-Hydroxybenzoyl-Benzoic Acid Hexyl Dee - -    205 25 Menthyl Anthranilate (Meradimate) - -    206 26 Bis-Ethylhexyloxyphenol-Methoxy Phenyl Triazine - -    207 27 Diethylhexyl Butamido Triazone - -    208 28 Disodium Phenyldibenzimidazole Tetrasulfonate - -    209 29 Octocrylene - -    210 30 Octyl Triazone - -    211 31 Tinsorb (Methylene - Bis - Benzotriazyl Tetramethylbutylphenol) - -      Procedure: Apply the same photopatch series 2  times on the back. Remove one patch side for irradiation after 1 day and then irradiate this site with 10 J/cm2 UVA. Remove the other, non-irradiated patch sites on day 2.   Maybe perform on these patients as well standardized UV-B and UV-A MED tests.    Results of patch tests:                         Interpretation:    - Negative                    A    =  Allergic      (+) Erythema    TI   = Toxic/irritant   + E + Infiltration    RaP = Relevance at Present     ++ E/I + Papulovesicle   Rpr  = Relevance Previously     +++ E/I/P + Blister     nR   = No Relevance          Atopy Screen    No Substance Readings (15 min) Evaluation   POS Histamine 1mg/ml +/++    NEG NaCl 0.9% -      No Substance Readings (15 min) Evaluation   1 Alternaria alternata (tenuis)  -    2 Cladosporium herbarum -    3 Aspergillus fumigatus -    4 Penicillium notatum -    5 Dermatophagoides pteronyssinus -    6 Dermatophagoides farinae -    7 Dog epithelium (canis spp) -    8 Cat hair (alyssa catus) -    9 Cockroach   (Blatella americana & germanica) -    10 Grass mix midwest   (Nano, Orchard, Redtop, Rickie) -    11 Kenrick grass (sorghum halepense) -    12 Weed mix   (common Cocklebur, Lamb s quarters, rough redroot Pigweed, Dock/Sorrel) -    13 Mug wort (artemisia vulgare) -    14 Ragweed giant/short (ambrosia spp) -    15 English Plantain (plantago lanceolata) -    16 Tree mix 1 (Pecan, Maple BHR, Oak RVW, american [place], black [place]) -    17 Red cedar (juniperus virginia) -    18 Tree mix 2   (white Tashi, river/red Birch, black [place], common [place], american Elm) -    19 Box elder/Maple mix (acer spp) -    20 Encampment shagbark (carya ovata) -       -      Conclusion: no signs for atopic predisposition in prick test      Intradermal Testing (Placed 10/19/20 )    No Substance Conc.  Readings (15min) Evaluation   - NaCl  0.9% - 3mmP, no E   + Histamine (prick) 0.1mg / ml -    DF Standard Dust Mite - D. Farinae 1:10 ++ 10mmP&E   DP Standard Dust Mite - D. Pteronyssinus 1:10 ++ 12mmP&E   A Aspergillus fumigatus  1:10 -    P Penicillium notatum 1:10 -      Conclusion: immediate type reaction to house dust mites in IDT, not to molds      [] No relevant allergic reaction observed    [] Allergic reaction diagnosed against following allergens:      Interpretation/ remarks:   See later    []  Patient information given   [] ACDS CAMP information's (# ....) to following compounds: .....   [] General information's to following compounds: ......    ==> final Diagnosis:    # recurrent dermatitis mostly exposed areas DDx atopic dermatitis with nummular dermatitis vs allergic contact dermatitis vs photoallergic dermatitis    Immediate type reaction to house dust mites in IDT = atopic    # excluding drug allergy with lip swelling to Amoxicillin  --> unlikely, because not first day of treatment  --> patient had several treatments with Amoxicillin before without problems?!  --> other medication?? Patient will bring the original medication box        These conclusions are made at the best of one's knowledge and belief based on the provided evidence such as patient's history and allergy test results and they can change over time or can be incomplete because of missing information's.    ==> Treatment prescribed/Plan:          Patient counseling:  Explained the patch tests      Follow-up:  For patch tests      Staff:  Beverly Hines & Anupama Kidd LPN      I spent a total of 23 minutes face to face with Freddy Day during today s office visit. Over 50% of this time was spent discussing all the individual test results, correlating them to the clinical relevance, counseling the patient and/or coordinating care. Please see Assessment and Plan for details.  This excludes any time spent performing prick, intradermal, patch and photopatch tests                    Patient had 165 patch tests placed this visit.  Patient had 30 photo patch tests placed this visit.     Standard panel (40 tests)    Preservatives & Antimicrobials (31 tests)    Emulsifiers & Additives (25 tests)     Perfumes/Flavours & Plants (24 tests)    Corticosteroids (15 tests)     Unexposed Photopatch test (30 tests)     Exposed Photopatch tests (30 tests)        Again, thank you for allowing me to participate in the care of your patient.         Sincerely,        Alexis Arevalo MD

## 2020-10-19 NOTE — PATIENT INSTRUCTIONS
Patch Testing Information  What are allergen patch tests?    The test is done to look for skin allergies that may be causing rashes and irritation.    A patch test is a way of identifying whether a substance has caused a delayed reaction with skin inflammation, such as contact eczema or delayed (after days) reactions to drugs.     We will use various types of test compounds, which may include common allergens you may come in contact with in daily life such as preservatives, fragrances or even drugs.     Most of the time we will use standardized, prefabricated test solutions. The choice of the substances and series tested will depend on your history of reactions. Sometimes we will test your own products as well.     In order to avoid severe toxic reactions we need detailed information or safety sheets for each of the test compounds.    The test panels are set up with a small amount of common substances that cause skin allergies. They are taped to your skin with a clear hypoallergenic bandage and reinforced hypoallergenic tape.    The substances are numbered, so it is easy to tell what is causing a skin reaction.  What do I need to do in preparation for the test?    Stop all systemic steroids 1 month prior to the testing.     Stop applying topical steroids to the test area one week prior to the test. It is to use them elsewhere throughout the testing process. If this is not possible then discuss options with the Allergy specialist.    Do not go sunbathing or tanning for one week prior to testing    It is okay to take antihistamines as normal.     Please wear dark colors the week of the test since we will write on you with a dark marker that may transfer and stain clothing or bedding.     Some medications can affect the reactions to allergens during the tests. Therefore reveal all the medications you take to the allergy team. The doctor will discuss the medications with you before the tests.     Eat how you normally  would.    Avoid the following:    You cannot get the test area wet during the entire test period. This means no bathing or swimming the entire test period.    No strenuous exercise that may cause sweating.    Do not scratch the test area, this can cause the allergen to spread and give us false positives.     Avoid exposure to UV irradiation. This means no tanning or UV treatment during the testing period.   What can I expect?    Patch testing is done over three appointments.   o The usual schedule is: Monday (Allergen patches are placed), Wednesday (Patches are removed and skin is examined by the MD), and Friday (Skin is examined by the MD)      If you are allergic, there will be an area of irritation where the test was placed.    Itching or burning at the test site might happen if you are allergic to the allergen.  Do not rub or scratch the test site since this may spread the allergen and possibly cause false positives. If itching or burning is not tolerable please contact the clinic.    The marker we draw on your back with ma take up to 5 weeks to wash off completely. Rubbing alcohol can help speed up this process.     Reactions can occur 1 to 2 weeks after the tests are applied. If this happens please take photos of the area and contact the clinic.  What should I do after the tests are placed?    Keep the area dry. No showering or getting the test area wet from the time we see you at your first visit until after your third appointment. If you get the test area wet you are washing off the test and we could get false negative reactions.    If you notice any of the test patches coming loose put on more tape to re-secure the test.    If the marker that is applied fades you can use a dark pen to mele around the panel sites.    Cover the test area when you are outside to avoid any sun exposure while the test is in place.     You can remove the tests only if there is a severe reaction (itch, pain, blistering). Please  report this to your doctor immediately. If you have to remove the tests please mele the locations of each test field with a grid so we can identify the allergen.  WHAT ARE THE POSSIBLE RISKS OF PATCH TESTS     If you are allergic to a compound tested you will develop a localized skin reaction similar to your previous reaction, this may take days to develop. These reactions include a formation of red, itchy skin lesions that could be about a centimeter with small vesicles or possibly even blisters. The lesions will fade over time, this may take weeks. The test might leave some skin pigmentation for a few months.     In rare cases a localized reaction to patch testing can become generalized.     The tests with your own products might have some risks because they are not standardized and unanticipated reactions could occur. We need as much information as possible to evaluate if your own products are safe to test and under what conditions. This has to be evaluated for each individual product.   Useful Contact Information    To contact your doctor you can either send a [employer] message or call [phone]    Address  o [address] [place] [address], [address]    If you develop any serious or adverse allergic reaction after office hours please seek immediate medical assistance at the nearest clinic, urgent care, or emergency room.    Removal of Patch Tests on Day 3:    1. Remove patches and tape from one test area (one rectangle)          2. Using the purple surgical markers provided (or other permanent marker), draw a grid around the test area so that the circular indentation is in the center of each square, as below. Try to be as neat as possible and keep the lines as straight as you can (you can use a ruler if you need to)          3. Redraw the number that was underneath the tape above the grids, as shown below. Try to print clearly.          4. Repeat the process for the remaining test  areas.          5. Photograph the entire area then take close-up photos of any possible reactions. Examples of possible reactions are spots that look like these:          6. Return to clinic for evaluation as instructed. You should continue to avoid getting the area wet (no showering or strenuous exercise), exposing the area to UV light, using topical steroids, or scratching.         Who should I call with questions?    [facility], [place]: [phone]    For urgent needs outside of business hours call the [facility] [[email or web address]] at [phone] and ask for the dermatology resident on call      If you develop any serious or adverse allergic reaction after office hours please seek immediate medical assistance at the nearest clinic or emergency room        WHAT IS A SKIN PRICK TEST?   A skin prick test is a simple and reliable diagnostic test used to identify substances ( allergens ) responsible for triggering the symptoms of allergy. The basic skin prick testing panel includes common allergens, such as house dust mites, molds, dog and cat allergens and pollen allergens. We have other more specialized series for various food allergens and sometimes we test your own foods directly on your skin as well. Tests will usually be performed on the skin of the forearm. The skin may develop a red and itchy reaction which can be an indication of an allergy to the tested substance, but will be confirmed by discussion with the allergy specialist     HOW IS A SKIN PRICK TEST PERFORMED?   The skin will be disinfected with alcohol, then marked and numbered with a pen to identify the areas where the specific allergens will be tested.   Afterwards a drop of the allergen solution will be placed on the skin and then the skin gently pricked using the tip of a specially designed prick needle.   With this procedure the most superficial part of the skin will be pierced to allow the test solution to diffuse  into the skin and make contact with the reactive immune cells.   After 20 min the area will be examined and evaluated for possible allergic reactions.     WHAT ARE THE POSSIBLE RISKS OF A SKIN PRICK TEST?   If the skin reacts it will develop red, itchy wheals of 5-30mm diameter.   The wheal and itch will usually disappear spontaneously after 1-2 hours.   Sometimes there might be a delayed reactions after days and this has to be reported to the treating allergy specialist (could be another kind of reaction pattern and important piece of information).   Rarely there are more serious generalized allergic reactions observed and therefore you will be under observation of the allergy team during the entire procedure.   There is a small risk for some bleeding and skin infection by the SPT.    WHAT DO I NEED TO DO BEFORE SKIN PRICK TEST?  Stop all antihistamines for at lease 1 week.  Stop all oral steroids at lease 1 month prior to testing.       House Dust Mite Allergy        The house dust mite is an arachnid about 0.3 mm in size and not visible to the naked eye. There are around 150 species of house dust mites in the world. One mite produces up to 40 fecal droppings a day. One teaspoonful of bedroom dust contains an average of nearly 1000 mites and 250,000 minute droppings.    Causes and triggers of house dust mite allergy  The house dust mite requires a warm, moist environment without light in order to live and reproduce. Our beds are ideal. The mite feeds on human and animal skin scales. The allergen is mainly contained in the mite's feces. The feces contain allergy-triggering constituents which are spread in fine dust, are breathed in and can cause an allergic reaction.    Symptoms  When the allergens come into contact with the mucous membranes in the eyes, nose, mouth and throat, sufferers develop symptoms typical of an allergic cold (allergic rhinitis) or an allergic inflammation of the conjunctiva (allergic  conjunctivitis): blocked or runny nose, sneezing, red, itchy eyes. If all of these symptoms are present, then the condition is also known as rhinoconjunctivitis. Often, the upper respiratory tract becomes chronically inflamed, primarily because house dust mites are present all year round.  The symptoms of house dust mite allergy typically occur in the morning and are more frequent in the cold months of the year.    Therapy and treatment  As a first step, mattress, pillows and duvet/comforter should be placed in mite-proof or anti-mite covers, sometimes known as encasings. Alternatively you can use pillows or comforter that can be washed at over 130 F monthly. At the same time, house dust should be minimized. If necessary, the symptoms can be treated with medication, for example antihistamines in the form of nasal sprays, eye drops and tablets. Desensitization/specific immunotherapy (SIT) is recommended for house dust allergy if all the measures above are not sufficient.    Tips and tricks:    Keep room temperature at 66-70 F and relative air humidity at a maximum of 50%.    Ideally, thoroughly air your home two to three times a day for 5 to 10 minutes each time.    Wash bed linens in at least 130 F every week.    Remove stuffed animals or freeze them every other week.    Keep ceiling fans off in the bedroom as they can stir up dust mite allergens.    Remove dust from furniture with a damp cloth and regularly wet mop floors.    Do not put pot and hydroponic plants in the bedroom and also avoid putting too many in living areas, as they increase room humidity.    When staying overnight in other accommodations, we recommend taking your own bed linen and the above anti-mite mattress covers with you.    Remove upholstered furniture from the bedroom and consider removing the carpets. Ideally, use sealed parquet or laminate se, cork tiles or se made of wood, novilon or PVC.    Maybe additionally reduce dust  "mites in mattress, upholstery, or se using hot steam .      Modified from \"House Dust Mite Allergy\" by [employer].    "

## 2020-10-19 NOTE — NURSING NOTE
Rooming Note    Freddy Day's goals for this visit include:   Chief Complaint   Patient presents with     Allergy Testing Followup     Freddy is here fro allergy testing day 1     Beverly Hines LPN

## 2020-10-21 ENCOUNTER — ALLIED HEALTH/NURSE VISIT (OUTPATIENT)
Dept: DERMATOLOGY | Facility: CLINIC | Age: 80
End: 2020-10-21
Payer: COMMERCIAL

## 2020-10-21 ENCOUNTER — OFFICE VISIT (OUTPATIENT)
Dept: ALLERGY | Facility: CLINIC | Age: 80
End: 2020-10-21
Payer: COMMERCIAL

## 2020-10-21 DIAGNOSIS — L23.89 ALLERGIC CONTACT DERMATITIS DUE TO OTHER AGENTS: ICD-10-CM

## 2020-10-21 DIAGNOSIS — L56.8 PHOTODERMATITIS: Primary | ICD-10-CM

## 2020-10-21 DIAGNOSIS — L20.81 ATOPIC NEURODERMATITIS: ICD-10-CM

## 2020-10-21 PROCEDURE — 99207 PR NO CHARGE LOS: CPT | Performed by: DERMATOLOGY

## 2020-10-21 ASSESSMENT — PAIN SCALES - GENERAL: PAINLEVEL: NO PAIN (0)

## 2020-10-21 NOTE — LETTER
10/21/2020         RE: Freddy Day  [address] [facility] [address]        Dear Colleague,    Thank you for referring your patient, Freddy Day, to the [facility] [place]. Please see a copy of my visit note below.    Note for return visit for Dermato-Allergy       Encounter Date: Oct 21, 2020    History of Present Illness:  Freddy Day is a 80 year old male who presents in person to the consult following information has been take directly from the prior notes, patients informations, Epic and/or other sources and exam/history performed by myself:     >> for UVA irradiation and 1st reading of patch tests    Additional comments and observations from review of the patient s chart including the following:    See notes for more details    ROS: Patient generally feeling well today   Physical Examination:  General: Well-appearing, appropriately-developed individual.  - Skin: Focused examination of the skin on test sites within the consultation was performed.   See results below  As above in HPI. No additional skin concerns.  - upper respiratory tract: currently no obvious Rhinitis  - lungs: no signs for active and present Asthma/Wheezing or coughing  - eyes: currently no active conjunctivits  - GI system/digestion: currently no problems, no bloating or Diarrhoea        Earlier History and Allergy exams:    2.5 years ago patient was on a party in [place] and got bitten on the ankle and then the rash did not disappear and then spread over the rest of legs ==> diagnosed as nummular eczema (got steroid injection and topical triamcinolone and within 3months gone)    Last December again in [place] and again insect bite on ankles and then spread over legs, arms, back/chest and face ==> dermatologist in [place] with 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 Consulted Oncologist in [place] and there all normal. Then went to Dr. Tristan and again biopsies = not lymphoma, but  one time spongiotic dermatitis.    Tried different creams, but not really improvement = has to use topical steroids daily    Patient has also chest pain and bloating --> cut out alcohol about 6 weeks ago and skin improved    Couple weeks ago back to [place] for about 1 week and 4 days later eczema started again.    Used some time ago Neosporin    Patient has since age of 52 in spring and fall rhinosinusitis.    Medications: Metoprolol, Terazosin, Pravastatin, Istolol eye drops (Glaucoma), Fiber supplement and Mirolax (Diverticulitis),   Rarely takes Ibuprofen or Tylenol    Patient since about 1 month on Tacrolimus and has some effects, but not very good.    Patient got Amoxicillin for the dermatitis in June 2020 and after 2-3 days lips were swelling up. Maybe it was not Amoxicillin, patient takes amoxicillin regularly before going to dentist and had never problems. Find out exaclty what medication was given in June and maybe remove Amoxicillin from Allergy list.    Past Medical History:   There is no problem list on file for this patient.    No past medical history on file.    Allergy History:     Allergies   Allergen Reactions     Amoxicillin Other (See Comments)     Lips swelling      Brimonidine Itching     Rosuvastatin Muscle Pain (Myalgia)     Timolol Itching       Social History:  The patient is retired. Patient has the following hobbies or non-occupational exposure      reports that he quit smoking about 30 years ago. He does not have any smokeless tobacco history on file.      Family History:  No family history on file.    Medications:  Current Outpatient Medications   Medication Sig Dispense Refill     betamethasone dipropionate (DIPROSONE) 0.05 % external cream APPLY TWICE DAILY TO ALL ITCHY SPOTS.       calcium polycarbophil (FIBERCON) 625 MG tablet Take 2 tablets by mouth 2 times daily       clobetasol (TEMOVATE) 0.05 % external solution APPLY TO SCALP 1 2 TIMES A DAY FOR ITCHY SKIN.       metoprolol  succinate ER (TOPROL-XL) 50 MG 24 hr tablet Take 50 mg by mouth       polyethylene glycol (MIRALAX) 17 GM/Dose powder Take 17 g by mouth       pravastatin (PRAVACHOL) 10 MG tablet Take 10 mg by mouth       terazosin (HYTRIN) 5 MG capsule Take 10 mg by mouth         Allergy Tests:    Past Allergy Test    Future Allergy Test: 8/31/2020     Order for PATCH TESTS    [] Outpatient  [] Inpatient: [place]..../ Bed ....      Skin Atopy (atopic dermatitis) [x] Yes   [] No  Rhinitis/Sinusitis:   [x] Yes   [] No (spring and fall) --> Sinusitis and Postnasal drip entire year. 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 DDx allergic contact dermatitis vs atopic dermatitis (nummular dermatitis)  Known previous allergies: none    Standardized panels  [x] Standard panel (40 tests)  [x] Preservatives & Antimicrobials (31 tests)  [x] Emulsifiers & Additives (25 tests)   [x] Perfumes/Flavours & Plants (25 tests)  [] Hairdresser panel (12 tests)  [] Rubber Chemicals (22 tests)  [] Plastics (26 tests)  [] Colorants/Dyes/Food additives (20 tests)  [] Metals (implants/dental) (24 tests)  [] Local anaesthetics/NSAIDs (13 tests)  [] Antibiotics & Antimycotics (14 tests)   [x] Corticosteroids (15 tests)   [x] Photopatch test (62 tests)   [] others: ...      [] Patient's own products: ...    DO NOT test if chemical or biological identity is unknown!     always ask from patient the product information and safety sheets (MSDS)   [x] Atopy screen prick test (Atopic predisposition): with IDT HDM and molds if prick negative    RESULTS & EVALUATION of PATCH TESTS    Patch test readings after     [x] 2 days, [] 3 days  [x] 4 days, [] 5 days,    Applied patch tests with results (import here the list of patch tests):     STANDARD Series         No Substance 2 days 4 days remarks   1 Juaquin Mix [C] - -    2 Colophony - -    3  2-Mercaptobenzothiazole  - -     4 Methylisothiazolinone - -    5 Carba Mix - -    6 Thiuram Mix [A] - -    7 Bisphenol A Epoxy Resin - -    8 B-Leww-Bouhxrinlff-Formaldehyde Resin - -    9 Mercapto Mix [A] - -    10 Black Rubber Mix- PPD [B] - -    11 Potassium Dichromate  -  -    12 Balsam of [place] (Myroxylon Pereirae Resin) - -    13 Nickel Sulphate Hexahydrate - -    14 Mixed Dialkyl Thiourea - -    15 Paraben Mix [B] - -    16 Methyldibromo Glutaronitrile - -    17 Fragrance Mix - -    18 2-Bromo-2-Nitropropane-1,3-Diol (Bronopol) - -    19 Lyral - -    20 Tixocortol-21- Pivalate +/++ -    21 Diazolidiyl Urea (Germall II) - -    22 Methyl Methacrylate - -    23 Cobalt (II) Chloride Hexahydrate - -    24 Fragrance Mix II  - -    25 Compositae Mix - -    26 Benzoyl Peroxide - -    27 Bacitracin - -    28 Formaldehyde - -    29 Methylchloroisothiazolinone / Methylisothiazolinone - -    30 Corticosteroid Mix - -    31 Sodium Lauryl Sulfate - -    32 Lanolin Alcohol - -    33 Turpentine - -    34 Cetylstearylalcohol - -    35 Chlorhexidine Dicluconate - -    36 Budenoside - -    37 Imidazolidinyl Urea  - -    38 Ethyl-2 Cyanoacrylate - -    39 Quaternium 15 (Dowicil 200) - -    40 Decyl Glucoside - -      PERFUMES, FLAVORS & PLANTS          No Substance 2 days 4 days remarks   41 1 Benzyl Salicylate - -    42 2 Benzyl Cinnamate - -    43 3 Di-Limonene (Dipentene) - -    44 4 Cananga Odorata (Ylang Ylang) (I) - -    45 5 Lichen Acid Mix - -    46 6 Mentha Piperita Oil (Peppermint Oil) - -    47 7 Sesquiterpenelactone mix - -    48 8 Tea Tree Oil, Oxidized - -    49 9 Wood Tar Mix - -    50 10 Abietic Acid - -    51 11 Lavendula Angustifolia Oil (Lavender Oil) - -    52 12 Camphor  NA NA      Fragrance Mix I       53 13 Oakmoss Absolute - -    54 14 Eugenol - -    55 15 Geraniol - -    56 16 Hydroxycitronellal - -    57 17 Isoeugenol - -    58 18 Cinnamic Aldehyde - -    59 19 Cinnamic Alcohol  - -    60 20 Sorbitane Sesquioleate - -      Fragrance mix II      61 21 Citronellol - -    62 22 Alpha-Hexylcinnamic Aldehyde    - -    63 23 Citral - -    64 24 Farnesol - -    65 25 Coumarin - -      CORTICOSTEROIDS     No Substance 2 days 4 days remarks Allergy  class   66 1 Amcinonide - -  B   67 2 Betametasone-17,21 Dipropionate - -  D1   68 3 Desoximetasone - -  C   69 4 Betamethasone-17-Valerate - -  D1   70 5 Dexamethasone - -  C   71 6 Hydrocortisone - -  A   72 7 Clobetasol-17-Propionate - -  D1   73 8 Dexamethasone-21-Phosphate Disodium Salt - -  C   74 9 Hydrocortisone-17 Butyrate - -  D2   75 10 Prednisolone - -  A   76 11 Mometason Furoate - -  D1   77 12 Triamcinolone Acetonide - -  B   78 13 Methylprednisolone Aceponate - -  D2   79 14 Hydrocortisone-21-Acetate - -  A   80 15 Prednicarbate - -  D2     Group Characteristics of group Generic name Name  cross reactions   A Hydrocortisone   Cloprednole, Fludrocortisone acétate, Hydrocortison acetate, Methylprednisolone, Prednisolone, Tixocortolpivalate Alfacortone, Fucidin H, Dermacalm, Hexacortone, Premandole, Imacort With group D2   B Triamcinolone-acetonide   Budenoside (R-isomer), Amcinonide, Desonide, Fluocinolone acetonide, Triamcinolone acetonide Locapred, Locatop  Synalar, Pevisone, Kenacort -   C Betamethasone (Without Dee)   Betamethasone, Dexamethasone, Flumethasone pivalate, Halomethasone Daivobet, Dexasalyl, Locasalen,   -   D1 Betamethasone-diproprionate   Betamethasone dipropionate, Betamethasone-17-valerate, Clobetasole-propionate, Fluticasone propionate, Mometasone furoate Betnovate, Diprogenta, Diprosalic, Diprosone, Celestoderm, Fucicort,  Cutivate, Axotide, Elocom -   D2 Methylprednisolone-aceponate   Hydrocortisone-aceponate,  Hydrocortisone-buteprate, Hydrocortisone-17-butyrate, Methylprednisolone aceponate, Prednicarbate Locoïd, Advantan,  Prednitop With group A and Budesonide (S-isomer)     EMULSIFIERS & ADDITIVES        No Substance 2 days 4 days remarks   81 1 Polyethylene Glycol-400 - -    82 2 Cocamidopropyl Betaine - -    83 3 Amerchol L101 - -    84 4 Propylene Glycol - -    85 5 Triethanolamine - -    86 6 Sorbitane Sesquiolate - -    87 7 Isopropylmyristate - -    88 8 Polysorbate 80  - -    89 9 Amidoamine   (Stearamidopropyl Dimethylamine) - -    90 10 Oleamidopropyl Dimethylamine - -    91 11 Lauryl Glucoside - -    92 12 Coconut Diethanolamide  - -    93 13 2-Hydroxy-4-Methoxy Benzophenone (Oxybenzone) - -    94 14 Benzophenone-4 (Sulisobenzon) - -    95 15 Propolis - -    96 16 Dexpanthenol - -    97 17 Carboxymethyl Cellulose Sodium NA NA    98 18 Abitol - -    99 19 Tert-Butylhydroquinone - -    100 20 Benzyl Salicylate - -      Antioxidant      101 21 Dodecyl Gallate - -    102 22 Butylhydroxyanisole (BHA) - -    103 23 Butylhydroxytoluene (BHT) - -    104 24 Di-Alpha-Tocopherol (Vit E) - -    105 25 Propyl Gallate - -    106 26 Zinc Pyrithione NA NA    107 27 Dimethylaminopropylamin (DMAPA) - -        PRESERVATIVES & ANTIMICROBIALS          No Substance 2 days 4 days remarks   108 1  1,2-Benzisothiazoline-3-One, Sodium Salt - -    109 2  1,3,5-Mk (2-Hydroxyethyl) - Hexahydrotriazine (Grotan BK) - -    110 3 2-Pkjnosnjyccgs-3-Nitro-1, 3-Propanediol - -    111 4  3, 4, 4' - Triclocarban - -    112 5 4 - Chloro - 3 - Cresol - -    113 6 4 - Chloro - 4 - Xylenol (PCMX) - -    114 7 7-Ethylbicyclooxazolidine (Bioban [id number]) (+) -    115 8 Benzalkonium Chloride - -    116 9 Benzyl Alcohol - -    117 10 Cetalkonium Chloride - -    118 11 Cetylpyrimidine Chloride  - -    119 12 Chloroacetamide - -    120 13 DMDM Hydantoin - -    121 14 Glutaraldehyde - -    122 15 Triclosan - -    123 16 Glyoxal Trimeric Dihydrate - -    124  17 Iodopropynyl Butylcarbamate - -    125 18 Octylisothiazoline - -    126 19 Iodoform NA NA    127 20 (Nitrobutyl) Morpholine/(Ethylnitro-Trimethylene) Dimorpholine (Bioban P 1487) - -    128 21 Phenoxyethanol - -    129 22 Phenyl Salicylate - -    130 23 Povidone Iodine - -    131 24 Sodium Benzoate - -    132 25 Sodium Disulfite - -    133 26 Sorbic Acid - -    134 27 Thimerosal - -    135 28 Melamine Formeladyde Resin        Parabens      136 29 Butyl-P-Hydroxybenzoate - -    137 30 Ethyl-P-Hydroxybenzoate - -    138 31 Methyl-P-Hydroxybenzoate - -    139 32 Propyl-P-Hydroxybenzoate - -      PHOTOPATCH tests (unexposed)          No Substance 2 days 4 days remarks   140 1 3,4,4'-Triclocarban - -    141 2 Coumarin - -    142 3 Bithiniol NA NA    143 4 Usnic Acid - -    144 5 Compositae Mix - -    145 6 Wood Tar Mix (+) -    146 7 Balsam of [place] (Myroxylon Pereirae Resin) - -    147 8 Hexachlorophene - -    148 9 Chlorhexidine Digluconate - -    149 10 Triclosan - -    150 11 Fragrance Mix - -    151 12 Ketoprofen  - -    152 13 Lichen Acid Mix - -    153 14 Musk Ambrette - -      Sunscreens (UV filters)  -    154 15 P-Aminobenzoic Acid - -    155 16 2-Hydroxy-4-Methoxy Benzophenone (Oxybenzone) - -    156 17 Tert-Butyl-Methoxydibenzoyl Methane  - -    157 18 3-4-Mehyl Benzyliden Camphor  - -    158 19 2-Ethylhexyl-P (Dimethylamino) Benzoate - -    159 20 0-Iuonugmx-3-Methoxy-4-Methyl Benzophenone - -    160 21 Benzyl Salicylate - -    161 22 Isoamyl-4-Methoxycinnamate - -    162 23 Phenyl Benzimidazole - 5 - Sulfonic Acid - -    163 24 2-Kebnkdolrnsed-3-Hydroxybenzoyl-Benzoic Acid Hexyl Dee - -    164 25 Menthyl Anthranilate (Meradimate) - -    165 26 Bis-Ethylhexyloxyphenol-Methoxy Phenyl Triazine - -    166 27 Diethylhexyl Butamido Triazone - -    167 28 Disodium Phenyldibenzimidazole Tetrasulfonate - -    168 29 Octocrylene - -    169 30 Octyl Triazone - -    170 31 Tinsorb (Methylene - Bis - Benzotriazyl  Tetramethylbutylphenol) - -      PHOTOPATCH tests (exposed)          No Substance 2 days 4 days remarks   181 1 3,4,4'-Triclocarban - -    182 2 Coumarin - -    183 3 Bithiniol NA AN    184 4 Usnic Acid - -    185 5 Compositae Mix - -    186 6 Wood Tar Mix - -    187 7 Balsam of [place] (Myroxylon Pereirae Resin) - -    188 8 Hexachlorophene - -    189 9 Chlorhexidine Digluconate - -    190 10 Triclosan - -    191 11 Fragrance Mix - -    192 12 Ketoprofen  - -    193 13 Lichen Acid Mix - -    194 14 Musk Ambrette - -      Sunscreens (UV filters)  -    195 15 P-Aminobenzoic Acid - -    196 16 2-Hydroxy-4-Methoxy Benzophenone (Oxybenzone) - -    197 17 Tert-Butyl-Methoxydibenzoyl Methane  - -    198 18 3-4-Mehyl Benzyliden Camphor  - -    199 19 2-Ethylhexyl-P (Dimethylamino) Benzoate - -    200 20 7-Gpqwgskt-0-Methoxy-4-Methyl Benzophenone - -    201 21 Benzyl Salicylate - -    202 22 Isoamyl-4-Methoxycinnamate - -    203 23 Phenyl Benzimidazole - 5 - Sulfonic Acid - -    204 24 1-Sdpaalcldmhdt-5-Hydroxybenzoyl-Benzoic Acid Hexyl Dee - -    205 25 Menthyl Anthranilate (Meradimate) - -    206 26 Bis-Ethylhexyloxyphenol-Methoxy Phenyl Triazine - -    207 27 Diethylhexyl Butamido Triazone - -    208 28 Disodium Phenyldibenzimidazole Tetrasulfonate - -    209 29 Octocrylene - -    210 30 Octyl Triazone - -    211 31 Tinsorb (Methylene - Bis - Benzotriazyl Tetramethylbutylphenol) - -      Procedure: Apply the same photopatch series 2  times on the back. Remove one patch side for irradiation after 1 day and then irradiate this site with 10 J/cm2 UVA. Remove the other, non-irradiated patch sites on day 2.   Maybe perform on these patients as well standardized UV-B and UV-A MED tests.    Results of patch tests:                         Interpretation:    - Negative                    A    = Allergic      (+) Erythema    TI   = Toxic/irritant   + E + Infiltration    RaP = Relevance at Present     ++ E/I + Papulovesicle   Rpr  =  Relevance Previously     +++ E/I/P + Blister     nR   = No Relevance          Atopy Screen    No Substance Readings (15 min) Evaluation   POS Histamine 1mg/ml +/++    NEG NaCl 0.9% -      No Substance Readings (15 min) Evaluation   1 Alternaria alternata (tenuis)  -    2 Cladosporium herbarum -    3 Aspergillus fumigatus -    4 Penicillium notatum -    5 Dermatophagoides pteronyssinus -    6 Dermatophagoides farinae -    7 Dog epithelium (canis spp) -    8 Cat hair (alyssa catus) -    9 Cockroach   (Blatella americana & germanica) -    10 Grass mix midwest   (Nano, Orchard, Redtop, Rickie) -    11 Kenrick grass (sorghum halepense) -    12 Weed mix   (common Cocklebur, Lamb s quarters, rough redroot Pigweed, Dock/Sorrel) -    13 Mug wort (artemisia vulgare) -    14 Ragweed giant/short (ambrosia spp) -    15 English Plantain (plantago lanceolata) -    16 Tree mix 1 (Pecan, Maple BHR, Oak RVW, american [place], black Westland) -    17 Red cedar (juniperus virginia) -    18 Tree mix 2   (white Tashi, river/red Birch, black Omer, common Cass, american Elm) -    19 Box elder/Maple mix (acer spp) -    20 McNairy shagbark (carya ovata) -       -      Conclusion: no signs for atopic predisposition in prick test      Intradermal Testing (Placed 10/19/20 )    No Substance Conc.  Readings (15min) Evaluation   - NaCl  0.9% - 3mmP, no E   + Histamine (prick) 0.1mg / ml -    DF Standard Dust Mite - D. Farinae 1:10 ++ 10mmP&E   DP Standard Dust Mite - D. Pteronyssinus 1:10 ++ 12mmP&E   A Aspergillus fumigatus  1:10 -    P Penicillium notatum 1:10 -      Conclusion: immediate type reaction to house dust mites in IDT, not to molds and delayed type reaction to house dust mites, not molds      [] No relevant allergic reaction observed    [x] Allergic reaction diagnosed against following allergens: house dust mites and group A Corticosteroids (Tixocortol Pivalate)      Interpretation/ remarks:   See later    [] Patient information  given   [] ACDS CAMP information's (# ....) to following compounds: .....   [] General information's to following compounds: ......    ==> final Diagnosis:    # recurrent dermatitis mostly exposed areas DDx atopic dermatitis with nummular dermatitis vs allergic contact dermatitis vs photoallergic dermatitis    Immediate and delayed type reaction to house dust mites in IDT = atopic dermatitis!    Possible delayed type sensitization to Corticosteroid group A (Tixocortole pivalate) ==> allergy reporting in Epic blocking group A steroids??    # excluding drug allergy with lip swelling to Amoxicillin  --> unlikely, because not first day of treatment  --> patient had several treatments with Amoxicillin before without problems?!  --> other medication?? Patient will bring the original medication box      These conclusions are made at the best of one's knowledge and belief based on the provided evidence such as patient's history and allergy test results and they can change over time or can be incomplete because of missing information's.    ==> Treatment prescribed/Plan:        Patient counseling:      Follow-up:  For 2nd readings on Friday      Thank you for the opportunity be involved in the care of this patient.     Staff:  Beverly Hines LPN      I spent a total of 20 minutes face to face with Freddytad Day during today s office visit. Over 50% of this time was spent discussing all the individual test results, correlating them to the clinical relevance, counseling the patient and/or coordinating care. Please see Assessment and Plan for details.        Again, thank you for allowing me to participate in the care of your patient.        Sincerely,        Alexis Arevalo MD

## 2020-10-21 NOTE — NURSING NOTE
Allergy Rooming Note    Freddy Day's goals for this visit include:   Chief Complaint   Patient presents with     Allergy Testing Followup     Freddy is here for patch testing day 3     Beverly Hines LPN

## 2020-10-21 NOTE — PROGRESS NOTES
Note for return visit for Dermato-Allergy       Encounter Date: Oct 21, 2020    History of Present Illness:  Freddy Day is a 80 year old male who presents in person to the consult following information has been take directly from the prior notes, patients informations, Epic and/or other sources and exam/history performed by myself:     >> for UVA irradiation and 1st reading of patch tests    Additional comments and observations from review of the patient s chart including the following:    See notes for more details    ROS: Patient generally feeling well today   Physical Examination:  General: Well-appearing, appropriately-developed individual.  - Skin: Focused examination of the skin on test sites within the consultation was performed.   See results below  As above in HPI. No additional skin concerns.  - upper respiratory tract: currently no obvious Rhinitis  - lungs: no signs for active and present Asthma/Wheezing or coughing  - eyes: currently no active conjunctivits  - GI system/digestion: currently no problems, no bloating or Diarrhoea        Earlier History and Allergy exams:    2.5 years ago patient was on a party in [place] and got bitten on the ankle and then the rash did not disappear and then spread over the rest of legs ==> diagnosed as nummular eczema (got steroid injection and topical triamcinolone and within 3months gone)    Last December again in [place] and again insect bite on ankles and then spread over legs, arms, back/chest and face ==> dermatologist in [place] with 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 Consulted Oncologist in [place] and there all normal. Then went to Dr. Tristan and again biopsies = not lymphoma, but one time spongiotic dermatitis.    Tried different creams, but not really improvement = has to use topical steroids daily    Patient has also chest pain and bloating --> cut out alcohol about 6 weeks ago and skin improved    Couple weeks ago back to [place] for  about 1 week and 4 days later eczema started again.    Used some time ago Neosporin    Patient has since age of 52 in spring and fall rhinosinusitis.    Medications: Metoprolol, Terazosin, Pravastatin, Istolol eye drops (Glaucoma), Fiber supplement and Mirolax (Diverticulitis),   Rarely takes Ibuprofen or Tylenol    Patient since about 1 month on Tacrolimus and has some effects, but not very good.    Patient got Amoxicillin for the dermatitis in June 2020 and after 2-3 days lips were swelling up. Maybe it was not Amoxicillin, patient takes amoxicillin regularly before going to dentist and had never problems. Find out exaclty what medication was given in June and maybe remove Amoxicillin from Allergy list.    Past Medical History:   There is no problem list on file for this patient.    No past medical history on file.    Allergy History:     Allergies   Allergen Reactions     Amoxicillin Other (See Comments)     Lips swelling      Brimonidine Itching     Rosuvastatin Muscle Pain (Myalgia)     Timolol Itching       Social History:  The patient is retired. Patient has the following hobbies or non-occupational exposure      reports that he quit smoking about 30 years ago. He does not have any smokeless tobacco history on file.      Family History:  No family history on file.    Medications:  Current Outpatient Medications   Medication Sig Dispense Refill     betamethasone dipropionate (DIPROSONE) 0.05 % external cream APPLY TWICE DAILY TO ALL ITCHY SPOTS.       calcium polycarbophil (FIBERCON) 625 MG tablet Take 2 tablets by mouth 2 times daily       clobetasol (TEMOVATE) 0.05 % external solution APPLY TO SCALP 1 2 TIMES A DAY FOR ITCHY SKIN.       metoprolol succinate ER (TOPROL-XL) 50 MG 24 hr tablet Take 50 mg by mouth       polyethylene glycol (MIRALAX) 17 GM/Dose powder Take 17 g by mouth       pravastatin (PRAVACHOL) 10 MG tablet Take 10 mg by mouth       terazosin (HYTRIN) 5 MG capsule Take 10 mg by mouth          Allergy Tests:    Past Allergy Test    Future Allergy Test: 8/31/2020     Order for PATCH TESTS    [] Outpatient  [] Inpatient: [facility]..../ Bed ....      Skin Atopy (atopic dermatitis) [x] Yes   [] No  Rhinitis/Sinusitis:   [x] Yes   [] No (spring and fall) --> Sinusitis and Postnasal drip entire year. 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 DDx allergic contact dermatitis vs atopic dermatitis (nummular dermatitis)  Known previous allergies: none    Standardized panels  [x] Standard panel (40 tests)  [x] Preservatives & Antimicrobials (31 tests)  [x] Emulsifiers & Additives (25 tests)   [x] Perfumes/Flavours & Plants (25 tests)  [] Hairdresser panel (12 tests)  [] Rubber Chemicals (22 tests)  [] Plastics (26 tests)  [] Colorants/Dyes/Food additives (20 tests)  [] Metals (implants/dental) (24 tests)  [] Local anaesthetics/NSAIDs (13 tests)  [] Antibiotics & Antimycotics (14 tests)   [x] Corticosteroids (15 tests)   [x] Photopatch test (62 tests)   [] others: ...      [] Patient's own products: ...    DO NOT test if chemical or biological identity is unknown!     always ask from patient the product information and safety sheets (MSDS)   [x] Atopy screen prick test (Atopic predisposition): with IDT HDM and molds if prick negative    RESULTS & EVALUATION of PATCH TESTS    Patch test readings after     [x] 2 days, [] 3 days [x] 4 days, [] 5 days,    Applied patch tests with results (import here the list of patch tests):     STANDARD Series         No Substance 2 days 4 days remarks   1 Juaquin Mix [C] - -    2 Colophony - -    3  2-Mercaptobenzothiazole  - -     4  Methylisothiazolinone - -    5 Carba Mix - -    6 Thiuram Mix [A] - -    7 Bisphenol A Epoxy Resin - -    8 C-Qlma-Mkzavvzijan-Formaldehyde Resin - -    9 Mercapto Mix [A] - -    10 Black Rubber Mix- PPD [B] - -    11 Potassium Dichromate  -  -    12 Balsam of [place] (Myroxylon Pereirae Resin) - -    13 Nickel Sulphate Hexahydrate - -    14 Mixed Dialkyl Thiourea - -    15 Paraben Mix [B] - -    16 Methyldibromo Glutaronitrile - -    17 Fragrance Mix - -    18 2-Bromo-2-Nitropropane-1,3-Diol (Bronopol) - -    19 Lyral - -    20 Tixocortol-21- Pivalate +/++ -    21 Diazolidiyl Urea (Germall II) - -    22 Methyl Methacrylate - -    23 Cobalt (II) Chloride Hexahydrate - -    24 Fragrance Mix II  - -    25 Compositae Mix - -    26 Benzoyl Peroxide - -    27 Bacitracin - -    28 Formaldehyde - -    29 Methylchloroisothiazolinone / Methylisothiazolinone - -    30 Corticosteroid Mix - -    31 Sodium Lauryl Sulfate - -    32 Lanolin Alcohol - -    33 Turpentine - -    34 Cetylstearylalcohol - -    35 Chlorhexidine Dicluconate - -    36 Budenoside - -    37 Imidazolidinyl Urea  - -    38 Ethyl-2 Cyanoacrylate - -    39 Quaternium 15 (Dowicil 200) - -    40 Decyl Glucoside - -      PERFUMES, FLAVORS & PLANTS          No Substance 2 days 4 days remarks   41 1 Benzyl Salicylate - -    42 2 Benzyl Cinnamate - -    43 3 Di-Limonene (Dipentene) - -    44 4 Cananga Odorata (Ylang Joseang) (I) - -    45 5 Lichen Acid Mix - -    46 6 Mentha Piperita Oil (Peppermint Oil) - -    47 7 Sesquiterpenelactone mix - -    48 8 Tea Tree Oil, Oxidized - -    49 9 Wood Tar Mix - -    50 10 Abietic Acid - -    51 11 Lavendula Angustifolia Oil (Lavender Oil) - -    52 12 Camphor  NA NA      Fragrance Mix I      53 13 Oakmoss Absolute - -    54 14 Eugenol - -    55 15 Geraniol - -    56 16 Hydroxycitronellal - -    57 17 Isoeugenol - -    58 18 Cinnamic Aldehyde - -    59 19 Cinnamic Alcohol  - -    60 20 Sorbitane Sesquioleate - -      Fragrance mix  II      61 21 Citronellol - -    62 22 Alpha-Hexylcinnamic Aldehyde    - -    63 23 Citral - -    64 24 Farnesol - -    65 25 Coumarin - -      CORTICOSTEROIDS     No Substance 2 days 4 days remarks Allergy  class   66 1 Amcinonide - -  B   67 2 Betametasone-17,21 Dipropionate - -  D1   68 3 Desoximetasone - -  C   69 4 Betamethasone-17-Valerate - -  D1   70 5 Dexamethasone - -  C   71 6 Hydrocortisone - -  A   72 7 Clobetasol-17-Propionate - -  D1   73 8 Dexamethasone-21-Phosphate Disodium Salt - -  C   74 9 Hydrocortisone-17 Butyrate - -  D2   75 10 Prednisolone - -  A   76 11 Mometason Furoate - -  D1   77 12 Triamcinolone Acetonide - -  B   78 13 Methylprednisolone Aceponate - -  D2   79 14 Hydrocortisone-21-Acetate - -  A   80 15 Prednicarbate - -  D2     Group Characteristics of group Generic name Name  cross reactions   A Hydrocortisone   Cloprednole, Fludrocortisone acétate, Hydrocortison acetate, Methylprednisolone, Prednisolone, Tixocortolpivalate Alfacortone, Fucidin H, Dermacalm, Hexacortone, Premandole, Imacort With group D2   B Triamcinolone-acetonide   Budenoside (R-isomer), Amcinonide, Desonide, Fluocinolone acetonide, Triamcinolone acetonide Locapred, Locatop  Synalar, Pevisone, Ken[place]ort -   C Betamethasone (Without Dee)   Betamethasone, Dexamethasone, Flumethasone pivalate, Halomethasone Daivobet, Dexasalyl, Locasalen,   -   D1 Betamethasone-diproprionate   Betamethasone dipropionate, Betamethasone-17-valerate, Clobetasole-propionate, Fluticasone propionate, Mometasone furoate Betnovate, Diprogenta, Diprosalic, Diprosone, Celestoderm, Fucicort,  Cutivate, Axotide, Elocom -   D2 Methylprednisolone-aceponate   Hydrocortisone-aceponate, Hydrocortisone-buteprate, Hydrocortisone-17-butyrate, Methylprednisolone aceponate, Prednicarbate Locoïd, Advantan,  Prednitop With group A and Budesonide (S-isomer)     EMULSIFIERS & ADDITIVES        No Substance 2 days 4 days remarks   81 1 Polyethylene Glycol-400  - -    82 2 Cocamidopropyl Betaine - -    83 3 Amerchol L101 - -    84 4 Propylene Glycol - -    85 5 Triethanolamine - -    86 6 Sorbitane Sesquiolate - -    87 7 Isopropylmyristate - -    88 8 Polysorbate 80  - -    89 9 Amidoamine   (Stearamidopropyl Dimethylamine) - -    90 10 Oleamidopropyl Dimethylamine - -    91 11 Lauryl Glucoside - -    92 12 Coconut Diethanolamide  - -    93 13 2-Hydroxy-4-Methoxy Benzophenone (Oxybenzone) - -    94 14 Benzophenone-4 (Sulisobenzon) - -    95 15 Propolis - -    96 16 Dexpanthenol - -    97 17 Carboxymethyl Cellulose Sodium NA NA    98 18 Abitol - -    99 19 Tert-Butylhydroquinone - -    100 20 Benzyl Salicylate - -      Antioxidant      101 21 Dodecyl Gallate - -    102 22 Butylhydroxyanisole (BHA) - -    103 23 Butylhydroxytoluene (BHT) - -    104 24 Di-Alpha-Tocopherol (Vit E) - -    105 25 Propyl Gallate - -    106 26 Zinc Pyrithione NA NA    107 27 Dimethylaminopropylamin (DMAPA) - -        PRESERVATIVES & ANTIMICROBIALS          No Substance 2 days 4 days remarks   108 1  1,2-Benzisothiazoline-3-One, Sodium Salt - -    109 2  1,3,5-Mk (2-Hydroxyethyl) - Hexahydrotriazine (Grotan BK) - -    110 3 1-Izlldsspxgqvv-1-Nitro-1, 3-Propanediol - -    111 4  3, 4, 4' - Triclocarban - -    112 5 4 - Chloro - 3 - Cresol - -    113 6 4 - Chloro - 4 - Xylenol (PCMX) - -    114 7 7-Ethylbicyclooxazolidine (Bioban [id number]) (+) -    115 8 Benzalkonium Chloride - -    116 9 Benzyl Alcohol - -    117 10 Cetalkonium Chloride - -    118 11 Cetylpyrimidine Chloride  - -    119 12 Chloroacetamide - -    120 13 DMDM Hydantoin - -    121 14 Glutaraldehyde - -    122 15 Triclosan - -    123 16 Glyoxal Trimeric Dihydrate - -    124 17 Iodopropynyl Butylcarbamate - -    125 18 Octylisothiazoline - -    126 19 Iodoform NA NA    127 20 (Nitrobutyl) Morpholine/(Ethylnitro-Trimethylene) Dimorpholine (Bioban P 1487) - -    128 21 Phenoxyethanol - -    129 22 Phenyl Salicylate - -    130 23 Povidone  Iodine - -    131 24 Sodium Benzoate - -    132 25 Sodium Disulfite - -    133 26 Sorbic Acid - -    134 27 Thimerosal - -    135 28 Melamine Formeladyde Resin        Parabens      136 29 Butyl-P-Hydroxybenzoate - -    137 30 Ethyl-P-Hydroxybenzoate - -    138 31 Methyl-P-Hydroxybenzoate - -    139 32 Propyl-P-Hydroxybenzoate - -      PHOTOPATCH tests (unexposed)          No Substance 2 days 4 days remarks   140 1 3,4,4'-Triclocarban - -    141 2 Coumarin - -    142 3 Bithiniol NA NA    143 4 Usnic Acid - -    144 5 Compositae Mix - -    145 6 Wood Tar Mix (+) -    146 7 Balsam of [place] (Myroxylon Pereirae Resin) - -    147 8 Hexachlorophene - -    148 9 Chlorhexidine Digluconate - -    149 10 Triclosan - -    150 11 Fragrance Mix - -    151 12 Ketoprofen  - -    152 13 Lichen Acid Mix - -    153 14 Musk Ambrette - -      Sunscreens (UV filters)  -    154 15 P-Aminobenzoic Acid - -    155 16 2-Hydroxy-4-Methoxy Benzophenone (Oxybenzone) - -    156 17 Tert-Butyl-Methoxydibenzoyl Methane  - -    157 18 3-4-Mehyl Benzyliden Camphor  - -    158 19 2-Ethylhexyl-P (Dimethylamino) Benzoate - -    159 20 3-Vhzmdjhm-7-Methoxy-4-Methyl Benzophenone - -    160 21 Benzyl Salicylate - -    161 22 Isoamyl-4-Methoxycinnamate - -    162 23 Phenyl Benzimidazole - 5 - Sulfonic Acid - -    163 24 5-Qwmyijfpmjejv-4-Hydroxybenzoyl-Benzoic Acid Hexyl Dee - -    164 25 Menthyl Anthranilate (Meradimate) - -    165 26 Bis-Ethylhexyloxyphenol-Methoxy Phenyl Triazine - -    166 27 Diethylhexyl Butamido Triazone - -    167 28 Disodium Phenyldibenzimidazole Tetrasulfonate - -    168 29 Octocrylene - -    169 30 Octyl Triazone - -    170 31 Tinsorb (Methylene - Bis - Benzotriazyl Tetramethylbutylphenol) - -      PHOTOPATCH tests (exposed)          No Substance 2 days 4 days remarks   181 1 3,4,4'-Triclocarban - -    182 2 Coumarin - -    183 3 Bithiniol NA AN    184 4 Usnic Acid - -    185 5 Compositae Mix - -    186 6 Wood Tar Mix - -    187 7  Balsam of [place] (Myroxylon Pereirae Resin) - -    188 8 Hexachlorophene - -    189 9 Chlorhexidine Digluconate - -    190 10 Triclosan - -    191 11 Fragrance Mix - -    192 12 Ketoprofen  - -    193 13 Lichen Acid Mix - -    194 14 Musk Ambrette - -      Sunscreens (UV filters)  -    195 15 P-Aminobenzoic Acid - -    196 16 2-Hydroxy-4-Methoxy Benzophenone (Oxybenzone) - -    197 17 Tert-Butyl-Methoxydibenzoyl Methane  - -    198 18 3-4-Mehyl Benzyliden Camphor  - -    199 19 2-Ethylhexyl-P (Dimethylamino) Benzoate - -    200 20 3-Yyzhfzcu-1-Methoxy-4-Methyl Benzophenone - -    201 21 Benzyl Salicylate - -    202 22 Isoamyl-4-Methoxycinnamate - -    203 23 Phenyl Benzimidazole - 5 - Sulfonic Acid - -    204 24 5-Eshffjahatvpk-7-Hydroxybenzoyl-Benzoic Acid Hexyl Dee - -    205 25 Menthyl Anthranilate (Meradimate) - -    206 26 Bis-Ethylhexyloxyphenol-Methoxy Phenyl Triazine - -    207 27 Diethylhexyl Butamido Triazone - -    208 28 Disodium Phenyldibenzimidazole Tetrasulfonate - -    209 29 Octocrylene - -    210 30 Octyl Triazone - -    211 31 Tinsorb (Methylene - Bis - Benzotriazyl Tetramethylbutylphenol) - -      Procedure: Apply the same photopatch series 2  times on the back. Remove one patch side for irradiation after 1 day and then irradiate this site with 10 J/cm2 UVA. Remove the other, non-irradiated patch sites on day 2.   Maybe perform on these patients as well standardized UV-B and UV-A MED tests.    Results of patch tests:                         Interpretation:    - Negative                    A    = Allergic      (+) Erythema    TI   = Toxic/irritant   + E + Infiltration    RaP = Relevance at Present     ++ E/I + Papulovesicle   Rpr  = Relevance Previously     +++ E/I/P + Blister     nR   = No Relevance          Atopy Screen    No Substance Readings (15 min) Evaluation   POS Histamine 1mg/ml +/++    NEG NaCl 0.9% -      No Substance Readings (15 min) Evaluation   1 Alternaria alternata (tenuis)  -     2 Cladosporium herbarum -    3 Aspergillus fumigatus -    4 Penicillium notatum -    5 Dermatophagoides pteronyssinus -    6 Dermatophagoides farinae -    7 Dog epithelium (canis spp) -    8 Cat hair (alyssa catus) -    9 Cockroach   (Blatella americana & germanica) -    10 Grass mix midwest   (Nano, Orchard, Redtop, Rickie) -    11 [place] grass (sorghum halepense) -    12 Weed mix   (common Cocklebur, Lamb s quarters, rough redroot Pigweed, Dock/Sorrel) -    13 Mug wort (artemisia vulgare) -    14 Ragweed giant/short (ambrosia spp) -    15 English Plantain (plantago lanceolata) -    16 Tree mix 1 (Pecan, Maple BHR, Oak RVW, american [place], black Tignall) -    17 Red cedar (juniperus virginia) -    18 Tree mix 2   (white Tashi, river/red Birch, black [place]reek, common [place], american Elm) -    19 Box elder/Maple mix (acer spp) -    20 [place] shagbark (carya ovata) -       -      Conclusion: no signs for atopic predisposition in prick test      Intradermal Testing (Placed 10/19/20 )    No Substance Conc.  Readings (15min) Evaluation   - NaCl  0.9% - 3mmP, no E   + Histamine (prick) 0.1mg / ml -    DF Standard Dust Mite - D. Farinae 1:10 ++ 10mmP&E   DP Standard Dust Mite - D. Pteronyssinus 1:10 ++ 12mmP&E   A Aspergillus fumigatus  1:10 -    P Penicillium notatum 1:10 -      Conclusion: immediate type reaction to house dust mites in IDT, not to molds and delayed type reaction to house dust mites, not molds      [] No relevant allergic reaction observed    [x] Allergic reaction diagnosed against following allergens: house dust mites and group A Corticosteroids (Tixocortol Pivalate)      Interpretation/ remarks:   See later    [] Patient information given   [] ACDS CAMP information's (# ....) to following compounds: .....   [] General information's to following compounds: ......    ==> final Diagnosis:    # recurrent dermatitis mostly exposed areas DDx atopic dermatitis with nummular dermatitis vs allergic  contact dermatitis vs photoallergic dermatitis    Immediate and delayed type reaction to house dust mites in IDT = atopic dermatitis!    Possible delayed type sensitization to Corticosteroid group A (Tixocortole pivalate) ==> allergy reporting in Epic blocking group A steroids??    # excluding drug allergy with lip swelling to Amoxicillin  --> unlikely, because not first day of treatment  --> patient had several treatments with Amoxicillin before without problems?!  --> other medication?? Patient will bring the original medication box      These conclusions are made at the best of one's knowledge and belief based on the provided evidence such as patient's history and allergy test results and they can change over time or can be incomplete because of missing information's.    ==> Treatment prescribed/Plan:        Patient counseling:      Follow-up:  For 2nd readings on Friday      Thank you for the opportunity be involved in the care of this patient.     Staff:  Beverly Hines LPN      I spent a total of 20 minutes face to face with Freddy Day during today s office visit. Over 50% of this time was spent discussing all the individual test results, correlating them to the clinical relevance, counseling the patient and/or coordinating care. Please see Assessment and Plan for details.

## 2020-10-21 NOTE — PROGRESS NOTES
[facility] Dermatology Phototherapy Record  1. Freddy Day is a 80 year old male is here today for phototherapy (PUVA) treatment for photodermatitsi.        Changes or new medications since last treatment:   NO    New medical conditions or problems since last treatment:   NO    Any problems with last phototherapy treatment?    NO    2. The patient tolerated phototherapy without complication    Patient will return on for next PUVA treatment, per protocol.     Patient to see provider every 4-12 weeks for follow-up during treatment.      All questions and concerns discussed with patient in clinic today.      Valarie Gramajo RN    Freddy Day comes into clinic today at the request of Dr. Arevalo Ordering Provider for PUVA.    This service provided today was under the supervising provider of the day Dr. Lyn, who was available if needed.    Valarie Gramajo RN

## 2020-10-23 ENCOUNTER — OFFICE VISIT (OUTPATIENT)
Dept: ALLERGY | Facility: CLINIC | Age: 80
End: 2020-10-23
Payer: COMMERCIAL

## 2020-10-23 DIAGNOSIS — Z88.9 DRUG ALLERGY: ICD-10-CM

## 2020-10-23 DIAGNOSIS — L20.81 ATOPIC NEURODERMATITIS: Primary | ICD-10-CM

## 2020-10-23 DIAGNOSIS — L23.89 ALLERGIC CONTACT DERMATITIS DUE TO OTHER AGENTS: ICD-10-CM

## 2020-10-23 PROCEDURE — 99214 OFFICE O/P EST MOD 30 MIN: CPT | Performed by: DERMATOLOGY

## 2020-10-23 ASSESSMENT — PAIN SCALES - GENERAL: PAINLEVEL: NO PAIN (0)

## 2020-10-23 NOTE — PATIENT INSTRUCTIONS
House Dust Mite Allergy        The house dust mite is an arachnid about 0.3 mm in size and not visible to the naked eye. There are around 150 species of house dust mites in the world. One mite produces up to 40 fecal droppings a day. One teaspoonful of bedroom dust contains an average of nearly 1000 mites and 250,000 minute droppings.    Causes and triggers of house dust mite allergy  The house dust mite requires a warm, moist environment without light in order to live and reproduce. Our beds are ideal. The mite feeds on human and animal skin scales. The allergen is mainly contained in the mite's feces. The feces contain allergy-triggering constituents which are spread in fine dust, are breathed in and can cause an allergic reaction.    Symptoms  When the allergens come into contact with the mucous membranes in the eyes, nose, mouth and throat, sufferers develop symptoms typical of an allergic cold (allergic rhinitis) or an allergic inflammation of the conjunctiva (allergic conjunctivitis): blocked or runny nose, sneezing, red, itchy eyes. If all of these symptoms are present, then the condition is also known as rhinoconjunctivitis. Often, the upper respiratory tract becomes chronically inflamed, primarily because house dust mites are present all year round.  The symptoms of house dust mite allergy typically occur in the morning and are more frequent in the cold months of the year.    Therapy and treatment  As a first step, mattress, pillows and duvet/comforter should be placed in mite-proof or anti-mite covers, sometimes known as encasings. Alternatively you can use pillows or comforter that can be washed at over 130 F monthly. At the same time, house dust should be minimized. If necessary, the symptoms can be treated with medication, for example antihistamines in the form of nasal sprays, eye drops and tablets. Desensitization/specific immunotherapy (SIT) is recommended for house dust allergy if all the measures  "above are not sufficient.    Tips and tricks:    Keep room temperature at 66-70 F and relative air humidity at a maximum of 50%.    Ideally, thoroughly air your home two to three times a day for 5 to 10 minutes each time.    Wash bed linens in at least 130 F every week.    Remove stuffed animals or freeze them every other week.    Keep ceiling fans off in the bedroom as they can stir up dust mite allergens.    Remove dust from furniture with a damp cloth and regularly wet mop floors.    Do not put pot and hydroponic plants in the bedroom and also avoid putting too many in living areas, as they increase room humidity.    When staying overnight in other accommodations, we recommend taking your own bed linen and the above anti-mite mattress covers with you.    Remove upholstered furniture from the bedroom and consider removing the carpets. Ideally, use sealed parquet or laminate se, cork tiles or se made of wood, novilon or PVC.    Maybe additionally reduce dust mites in mattress, upholstery, or se using hot steam .      Modified from \"House Dust Mite Allergy\" by [employer] [facility].      RESULTS & EVALUATION of PATCH TESTS    Patch test readings after     [x] 2 days, [] 3 days [x] 4 days, [] 5 days,    Applied patch tests with results (import here the list of patch tests):     STANDARD Series         No Substance 2 days 4 days remarks   1 Juaquin Mix [C] - -    2 Colophony - -    3  2-Mercaptobenzothiazole  - -     4 Methylisothiazolinone - -    5 Carba Mix - -    6 Thiuram Mix [A] - -    7 Bisphenol A Epoxy Resin - -    8 F-Lecy-Zeonfqhtqzi-Formaldehyde Resin - -    9 Mercapto Mix [A] - -    10 Black Rubber Mix- PPD [B] - -    11 Potassium Dichromate  -  -    12 Balsam of [place] (Myroxylon Pereirae Resin) - -    13 Nickel Sulphate Hexahydrate - -    14 Mixed Dialkyl Thiourea - -    15 Paraben Mix [B] - -    16 Methyldibromo Glutaronitrile - -    17 Fragrance Mix - -    18 " 2-Bromo-2-Nitropropane-1,3-Diol (Bronopol) - -    19 Lyral - -    20 Tixocortol-21- Pivalate +/++ ++/+++    21 Diazolidiyl Urea (Germall II) - -    22 Methyl Methacrylate - -    23 Cobalt (II) Chloride Hexahydrate - -    24 Fragrance Mix II  - -    25 Compositae Mix - -    26 Benzoyl Peroxide - -    27 Bacitracin - -    28 Formaldehyde - -    29 Methylchloroisothiazolinone / Methylisothiazolinone - -    30 Corticosteroid Mix - -    31 Sodium Lauryl Sulfate - -    32 Lanolin Alcohol - -    33 Turpentine - -    34 Cetylstearylalcohol - -    35 Chlorhexidine Dicluconate - -    36 Budenoside - -    37 Imidazolidinyl Urea  - +/++    38 Ethyl-2 Cyanoacrylate - -    39 Quaternium 15 (Dowicil 200) - +/++    40 Decyl Glucoside - -      PERFUMES, FLAVORS & PLANTS          No Substance 2 days 4 days remarks   41 1 Benzyl Salicylate - -    42 2 Benzyl Cinnamate - -    43 3 Di-Limonene (Dipentene) - -    44 4 Cananga Odorata (Ozzy Preciado) (I) - -    45 5 Lichen Acid Mix - -    46 6 Mentha Piperita Oil (Peppermint Oil) - -    47 7 Sesquiterpenelactone mix - -    48 8 Tea Tree Oil, Oxidized - -    49 9 Wood Tar Mix - -    50 10 Abietic Acid - -    51 11 Lavendula Angustifolia Oil (Lavender Oil) - -    52 12 Camphor  NA NA      Fragrance Mix I      53 13 Oakmoss Absolute - -    54 14 Eugenol - -    55 15 Geraniol - -    56 16 Hydroxycitronellal - -    57 17 Isoeugenol - -    58 18 Cinnamic Aldehyde - -    59 19 Cinnamic Alcohol  - -    60 20 Sorbitane Sesquioleate - -      Fragrance mix II      61 21 Citronellol - -    62 22 Alpha-Hexylcinnamic Aldehyde    - -    63 23 Citral - -    64 24 Farnesol - -    65 25 Coumarin - -      CORTICOSTEROIDS     No Substance 2 days 4 days remarks Allergy  class   66 1 Amcinonide - -  B   67 2 Betametasone-17,21 Dipropionate - -  D1   68 3 Desoximetasone - -  C   69 4 Betamethasone-17-Valerate - -  D1   70 5 Dexamethasone - -  C   71 6 Hydrocortisone - -  A   72 7 Clobetasol-17-Propionate - -  D1    73 8 Dexamethasone-21-Phosphate Disodium Salt - -  C   74 9 Hydrocortisone-17 Butyrate - -  D2   75 10 Prednisolone - -  A   76 11 Mometason Furoate - -  D1   77 12 Triamcinolone Acetonide - -  B   78 13 Methylprednisolone Aceponate - -  D2   79 14 Hydrocortisone-21-Acetate - -  A   80 15 Prednicarbate - -  D2         EMULSIFIERS & ADDITIVES        No Substance 2 days 4 days remarks   81 1 Polyethylene Glycol-400 - -    82 2 Cocamidopropyl Betaine - -    83 3 Amerchol L101 - -    84 4 Propylene Glycol - -    85 5 Triethanolamine - -    86 6 Sorbitane Sesquiolate - -    87 7 Isopropylmyristate - -    88 8 Polysorbate 80  - -    89 9 Amidoamine   (Stearamidopropyl Dimethylamine) - -    90 10 Oleamidopropyl Dimethylamine - -    91 11 Lauryl Glucoside - -    92 12 Coconut Diethanolamide  - -    93 13 2-Hydroxy-4-Methoxy Benzophenone (Oxybenzone) - -    94 14 Benzophenone-4 (Sulisobenzon) - -    95 15 Propolis - -    96 16 Dexpanthenol - -    97 17 Carboxymethyl Cellulose Sodium NA NA    98 18 Abitol - -    99 19 Tert-Butylhydroquinone - -    100 20 Benzyl Salicylate - -      Antioxidant      101 21 Dodecyl Gallate - -    102 22 Butylhydroxyanisole (BHA) - -    103 23 Butylhydroxytoluene (BHT) - -    104 24 Di-Alpha-Tocopherol (Vit E) - -    105 25 Propyl Gallate - -    106 26 Zinc Pyrithione NA NA    107 27 Dimethylaminopropylamin (DMAPA) - -        PRESERVATIVES & ANTIMICROBIALS          No Substance 2 days 4 days remarks   108 1  1,2-Benzisothiazoline-3-One, Sodium Salt - -    109 2  1,3,5-Mk (2-Hydroxyethyl) - Hexahydrotriazine (Grotan BK) - -    110 3 4-Aqoxgishgbaza-3-Nitro-1, 3-Propanediol - -    111 4  3, 4, 4' - Triclocarban - -    112 5 4 - Chloro - 3 - Cresol - -    113 6 4 - Chloro - 4 - Xylenol (PCMX) - -    114 7 7-Ethylbicyclooxazolidine (Bioban [id number]) (+) ++    115 8 Benzalkonium Chloride - -    116 9 Benzyl Alcohol - -    117 10 Cetalkonium Chloride - -    118 11 Cetylpyrimidine Chloride  - -    119  12 Chloroacetamide - -    120 13 DMDM Hydantoin - -    121 14 Glutaraldehyde - -    122 15 Triclosan - -    123 16 Glyoxal Trimeric Dihydrate - -    124 17 Iodopropynyl Butylcarbamate - -    125 18 Octylisothiazoline - -    126 19 Iodoform NA NA    127 20 (Nitrobutyl) Morpholine/(Ethylnitro-Trimethylene) Dimorpholine (Bioban P 1487) - -    128 21 Phenoxyethanol - -    129 22 Phenyl Salicylate - -    130 23 Povidone Iodine - -    131 24 Sodium Benzoate - -    132 25 Sodium Disulfite - +/++    133 26 Sorbic Acid - -    134 27 Thimerosal - -    135 28 Melamine Formeladyde Resin        Parabens      136 29 Butyl-P-Hydroxybenzoate - -    137 30 Ethyl-P-Hydroxybenzoate - -    138 31 Methyl-P-Hydroxybenzoate - -    139 32 Propyl-P-Hydroxybenzoate - -      PHOTOPATCH tests (unexposed)          No Substance 2 days 4 days remarks   140 1 3,4,4'-Triclocarban - -    141 2 Coumarin - -    142 3 Bithiniol NA NA    143 4 Usnic Acid - -    144 5 Compositae Mix - -    145 6 Wood Tar Mix (+) -    146 7 Balsam of [place] (Myroxylon Pereirae Resin) - -    147 8 Hexachlorophene - -    148 9 Chlorhexidine Digluconate - -    149 10 Triclosan - -    150 11 Fragrance Mix - -    151 12 Ketoprofen  - -    152 13 Lichen Acid Mix - -    153 14 Musk Ambrette - -      Sunscreens (UV filters)  -    154 15 P-Aminobenzoic Acid - -    155 16 2-Hydroxy-4-Methoxy Benzophenone (Oxybenzone) - -    156 17 Tert-Butyl-Methoxydibenzoyl Methane  - -    157 18 3-4-Mehyl Benzyliden Camphor  - -    158 19 2-Ethylhexyl-P (Dimethylamino) Benzoate - -    159 20 9-Cfifsqwv-4-Methoxy-4-Methyl Benzophenone - -    160 21 Benzyl Salicylate - -    161 22 Isoamyl-4-Methoxycinnamate - -    162 23 Phenyl Benzimidazole - 5 - Sulfonic Acid - -    163 24 6-Unlexjddbuymv-5-Hydroxybenzoyl-Benzoic Acid Hexyl Dee - -    164 25 Menthyl Anthranilate (Meradimate) - -    165 26 Bis-Ethylhexyloxyphenol-Methoxy Phenyl Triazine - -    166 27 Diethylhexyl Butamido Triazone - -    167 28  Disodium Phenyldibenzimidazole Tetrasulfonate - -    168 29 Octocrylene - -    169 30 Octyl Triazone - -    170 31 Tinsorb (Methylene - Bis - Benzotriazyl Tetramethylbutylphenol) - -      PHOTOPATCH tests (exposed)          No Substance 2 days 4 days remarks   181 1 3,4,4'-Triclocarban - -    182 2 Coumarin - -    183 3 Bithiniol NA AN    184 4 Usnic Acid - -    185 5 Compositae Mix - -    186 6 Wood Tar Mix - -    187 7 Balsam of [place] (Myroxylon Pereirae Resin) - -    188 8 Hexachlorophene - -    189 9 Chlorhexidine Digluconate - -    190 10 Triclosan - -    191 11 Fragrance Mix - -    192 12 Ketoprofen  - -    193 13 Lichen Acid Mix - -    194 14 Musk Ambrette - -      Sunscreens (UV filters)  -    195 15 P-Aminobenzoic Acid - -    196 16 2-Hydroxy-4-Methoxy Benzophenone (Oxybenzone) - -    197 17 Tert-Butyl-Methoxydibenzoyl Methane  - -    198 18 3-4-Mehyl Benzyliden Camphor  - -    199 19 2-Ethylhexyl-P (Dimethylamino) Benzoate - -    200 20 6-Joijlwms-5-Methoxy-4-Methyl Benzophenone - -    201 21 Benzyl Salicylate - -    202 22 Isoamyl-4-Methoxycinnamate - -    203 23 Phenyl Benzimidazole - 5 - Sulfonic Acid - -    204 24 8-Okyeixlzohqpt-2-Hydroxybenzoyl-Benzoic Acid Hexyl Dee - -    205 25 Menthyl Anthranilate (Meradimate) - -    206 26 Bis-Ethylhexyloxyphenol-Methoxy Phenyl Triazine - -    207 27 Diethylhexyl Butamido Triazone - -    208 28 Disodium Phenyldibenzimidazole Tetrasulfonate - -    209 29 Octocrylene - -    210 30 Octyl Triazone - -    211 31 Tinsorb (Methylene - Bis - Benzotriazyl Tetramethylbutylphenol) - -      Procedure: Apply the same photopatch series 2  times on the back. Remove one patch side for irradiation after 1 day and then irradiate this site with 10 J/cm2 UVA. Remove the other, non-irradiated patch sites on day 2.   Maybe perform on these patients as well standardized UV-B and UV-A MED tests.    Results of patch tests:                         Interpretation:    - Negative                     A    = Allergic      (+) Erythema    TI   = Toxic/irritant   + E + Infiltration    RaP = Relevance at Present     ++ E/I + Papulovesicle   Rpr  = Relevance Previously     +++ E/I/P + Blister     nR   = No Relevance          Atopy Screen    No Substance Readings (15 min) Evaluation   POS Histamine 1mg/ml +/++    NEG NaCl 0.9% -      No Substance Readings (15 min) Evaluation   1 Alternaria alternata (tenuis)  -    2 Cladosporium herbarum -    3 Aspergillus fumigatus -    4 Penicillium notatum -    5 Dermatophagoides pteronyssinus -    6 Dermatophagoides farinae -    7 Dog epithelium (canis spp) -    8 Cat hair (alyssa catus) -    9 Cockroach   (Blatella americana & germanica) -    10 Grass mix midwest   (Nano, Orchard, Redtop, Rickie) -    11 [place] grass (sorghum halepense) -    12 Weed mix   (common Cocklebur, Lamb s quarters, rough redroot Pigweed, Dock/Sorrel) -    13 Mug wort (artemisia vulgare) -    14 Ragweed giant/short (ambrosia spp) -    15 English Plantain (plantago lanceolata) -    16 Tree mix 1 (Pecan, Maple BHR, Oak RVW, american [place], black [place]) -    17 Red cedar (juniperus virginia) -    18 Tree mix 2   (white Tashi, river/red Birch, black [place], common [place], american Elm) -    19 Box elder/Maple mix (acer spp) -    20 [place] shagbark (carya ovata) -       -      Conclusion: no signs for atopic predisposition in prick test      Intradermal Testing (Placed 10/19/20 )    No Substance Conc.  Readings (15min) Evaluation   - NaCl  0.9% - 3mmP, no E   + Histamine (prick) 0.1mg / ml -    DF Standard Dust Mite - D. Farinae 1:10 ++ 10mmP&E   DP Standard Dust Mite - D. Pteronyssinus 1:10 ++ 12mmP&E   A Aspergillus fumigatus  1:10 -    P Penicillium notatum 1:10 -      Conclusion: immediate type reaction to house dust mites in IDT, not to molds and delayed type reaction to house dust mites, not molds      [] No relevant allergic reaction observed    [x] Allergic reaction diagnosed against  following allergens:    Class A corticosteroid Tixocortole pivalate +++    Preservatives: 7-Ethylbicyclooxazolidine (Bioban [id number])++, Sodium Disulfite ++, Imidazolidinyl Urea +/++ and Quaternium 15 (Dowicil 200) +/++    house dust mites (immediate and delayed type reactions)      Interpretation/ remarks:   Relevant sensitizations to preservatives and also group A corticosteroid Tixocortole pivalate. This can be in any type of creams or cosmetic products.  In addition, atopic predisposition with immediate and delayed type hypersensitivity to house dust mites    [x] Patient information given   [x] [facility] [id number], and [id number]) to  following compounds: Class A  corticosteroid Tixocortole pivalate,  7-Ethylbicyclooxazolidine (Bioban  [id number]), Sodium  Disulfite,  Imidazolidinyl Urea and Quaternium 15  (Dowicil 200)        ==> final Diagnosis:    # recurrent dermatitis mostly exposed areas with proven contact sensitization to:    Various preservatives    Class A corticosteroids  and additionally atopic predisposition with:    Immediate and delayed type reaction to house dust mites in IDT = atopic dermatitis!      # excluding drug allergy with lip swelling to Amoxicillin  --> unlikely, because not first day of treatment  --> patient had several treatments with Amoxicillin before without problems?!  --> other medication?? Patient will bring the original medication box      These conclusions are made at the best of one's knowledge and belief based on the provided evidence such as patient's history and allergy test results and they can change over time or can be incomplete because of missing information's.    ==> Patient counseling:  >> follow exactly the recommendations of the [place] [facility]  >> info given for house dust mite reduction  >> patient should avoid ALL Corticosteroids of Class A and D2 (see list below in red) and only use Corticosteroids of Class B, C or D1.      Group Characteristics of group  Generic name Name  cross reactions   A Hydrocortisone   Cloprednole, Fludrocortisone acétate, Hydrocortison acetate, Methylprednisolone, Prednisolone, Tixocortolpivalate Alfacortone, Fucidin H, Dermacalm, Hexacortone, Premandole, Imacort With group D2   B Triamcinolone-acetonide   Budenoside (R-isomer), Amcinonide, Desonide, Fluocinolone acetonide, Triamcinolone acetonide Locapred, Locatop  Synalar, Pevisone, [place] -   C Betamethasone (Without Dee)   Betamethasone, Dexamethasone, Flumethasone pivalate, Halomethasone Daivobet, Dexasalyl, Locasalen,   -   D1 Betamethasone-diproprionate   Betamethasone dipropionate, Betamethasone-17-valerate, Clobetasole-propionate, Fluticasone propionate, Mometasone furoate Betnovate, Diprogenta, Diprosalic, Diprosone, Celestoderm, Fucicort,  Cutivate, Axotide, Elocom -   D2 Methylprednisolone-aceponate   Hydrocortisone-aceponate, Hydrocortisone-buteprate, Hydrocortisone-17-butyrate, Methylprednisolone aceponate, Prednicarbate Locoïd, Advantan,  Prednitop With group A and Budesonide (S-isomer)       Follow-up: in with Dr. Tristan in [place]      Thank you for the opportunity be involved in the care of this patient.

## 2020-10-23 NOTE — PROGRESS NOTES
Chief Complaint   Patient presents with     Allergy Testing Followup     Patch testing day #5     Anupama Kidd LPN

## 2020-10-23 NOTE — PROGRESS NOTES
Note for return visit for Dermato-Allergy       Encounter Date: Oct 23, 2020    History of Present Illness:  Freddy Day is a 80 year old male who presents in person to the consult following information has been take directly from the prior notes, patients informations, Epic and/or other sources and exam/history performed by myself:     Patient here for 2nd reading of patch tests and conclusions    Additional comments and observations from review of the patient s chart including the following:    See notes for more details    ROS: Patient generally feeling well today   Physical Examination:  General: Well-appearing, appropriately-developed individual.  - Skin: Focused examination of the test sites on back within the consultation was performed.  See test results   As above in HPI. No additional skin concerns.  - upper respiratory tract: currently no obvious Rhinitis  - lungs: no signs for active and present Asthma/Wheezing or coughing  - eyes: currently no active conjunctivits  - GI system/digestion: currently no problems, no bloating or Diarrhoea        Earlier History and Allergy exams:    2.5 years ago patient was on a party in [place] and got bitten on the ankle and then the rash did not disappear and then spread over the rest of legs ==> diagnosed as nummular eczema (got steroid injection and topical triamcinolone and within 3months gone)    Last December again in [place] and again insect bite on ankles and then spread over legs, arms, back/chest and face ==> dermatologist in [place] with 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 Consulted Oncologist in [place] and there all normal. Then went to Dr. Tristan and again biopsies = not lymphoma, but one time spongiotic dermatitis.    Tried different creams, but not really improvement = has to use topical steroids daily    Patient has also chest pain and bloating --> cut out alcohol about 6 weeks ago and skin improved    Couple weeks ago back to [place]  for about 1 week and 4 days later eczema started again.    Used some time ago Neosporin    Patient has since age of 52 in spring and fall rhinosinusitis.    Medications: Metoprolol, Terazosin, Pravastatin, Istolol eye drops (Glaucoma), Fiber supplement and Mirolax (Diverticulitis),   Rarely takes Ibuprofen or Tylenol    Patient since about 1 month on Tacrolimus and has some effects, but not very good.    Patient got Amoxicillin for the dermatitis in June 2020 and after 2-3 days lips were swelling up. Maybe it was not Amoxicillin, patient takes amoxicillin regularly before going to dentist and had never problems. Find out exaclty what medication was given in June and maybe remove Amoxicillin from Allergy list.    Past Medical History:   There is no problem list on file for this patient.    No past medical history on file.    Allergy History:     Allergies   Allergen Reactions     Amoxicillin Other (See Comments)     Lips swelling      Brimonidine Itching     Rosuvastatin Muscle Pain (Myalgia)     Timolol Itching       Social History:  The patient is retired. Patient has the following hobbies or non-occupational exposure      reports that he quit smoking about 30 years ago. He does not have any smokeless tobacco history on file.      Family History:  No family history on file.    Medications:  Current Outpatient Medications   Medication Sig Dispense Refill     betamethasone dipropionate (DIPROSONE) 0.05 % external cream APPLY TWICE DAILY TO ALL ITCHY SPOTS.       calcium polycarbophil (FIBERCON) 625 MG tablet Take 2 tablets by mouth 2 times daily       clobetasol (TEMOVATE) 0.05 % external solution APPLY TO SCALP 1 2 TIMES A DAY FOR ITCHY SKIN.       metoprolol succinate ER (TOPROL-XL) 50 MG 24 hr tablet Take 50 mg by mouth       polyethylene glycol (MIRALAX) 17 GM/Dose powder Take 17 g by mouth       pravastatin (PRAVACHOL) 10 MG tablet Take 10 mg by mouth       terazosin (HYTRIN) 5 MG capsule Take 10 mg by mouth          Allergy Tests:    Past Allergy Test    Future Allergy Test: 8/31/2020     Order for PATCH TESTS    [] Outpatient  [] Inpatient: [facility]..../ Bed ....      Skin Atopy (atopic dermatitis) [x] Yes   [] No  Rhinitis/Sinusitis:   [x] Yes   [] No (spring and fall) --> Sinusitis and Postnasal drip entire year. 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 DDx allergic contact dermatitis vs atopic dermatitis (nummular dermatitis)  Known previous allergies: none    Standardized panels  [x] Standard panel (40 tests)  [x] Preservatives & Antimicrobials (31 tests)  [x] Emulsifiers & Additives (25 tests)   [x] Perfumes/Flavours & Plants (25 tests)  [] Hairdresser panel (12 tests)  [] Rubber Chemicals (22 tests)  [] Plastics (26 tests)  [] Colorants/Dyes/Food additives (20 tests)  [] Metals (implants/dental) (24 tests)  [] Local anaesthetics/NSAIDs (13 tests)  [] Antibiotics & Antimycotics (14 tests)   [x] Corticosteroids (15 tests)   [x] Photopatch test (62 tests)   [] others: ...      [] Patient's own products: ...    DO NOT test if chemical or biological identity is unknown!     always ask from patient the product information and safety sheets (MSDS)   [x] Atopy screen prick test (Atopic predisposition): with IDT HDM and molds if prick negative    RESULTS & EVALUATION of PATCH TESTS    Patch test readings after     [x] 2 days, [] 3 days [x] 4 days, [] 5 days,    Applied patch tests with results (import here the list of patch tests):     STANDARD Series         No Substance 2 days 4 days remarks   1 Juaquin Mix [C] - -    2 Colophony - -    3  2-Mercaptobenzothiazole  - -     4  Methylisothiazolinone - -    5 Carba Mix - -    6 Thiuram Mix [A] - -    7 Bisphenol A Epoxy Resin - -    8 R-Jqxi-Swmsccbkots-Formaldehyde Resin - -    9 Mercapto Mix [A] - -    10 Black Rubber Mix- PPD [B] - -    11 Potassium Dichromate  -  -    12 Balsam of [place] (Myroxylon Pereirae Resin) - -    13 Nickel Sulphate Hexahydrate - -    14 Mixed Dialkyl Thiourea - -    15 Paraben Mix [B] - -    16 Methyldibromo Glutaronitrile - -    17 Fragrance Mix - -    18 2-Bromo-2-Nitropropane-1,3-Diol (Bronopol) - -    19 Lyral - -    20 Tixocortol-21- Pivalate +/++ ++/+++    21 Diazolidiyl Urea (Germall II) - -    22 Methyl Methacrylate - -    23 Cobalt (II) Chloride Hexahydrate - -    24 Fragrance Mix II  - -    25 Compositae Mix - -    26 Benzoyl Peroxide - -    27 Bacitracin - -    28 Formaldehyde - -    29 Methylchloroisothiazolinone / Methylisothiazolinone - -    30 Corticosteroid Mix - -    31 Sodium Lauryl Sulfate - -    32 Lanolin Alcohol - -    33 Turpentine - -    34 Cetylstearylalcohol - -    35 Chlorhexidine Dicluconate - -    36 Budenoside - -    37 Imidazolidinyl Urea  - +/++    38 Ethyl-2 Cyanoacrylate - -    39 Quaternium 15 (Dowicil 200) - +/++    40 Decyl Glucoside - -      PERFUMES, FLAVORS & PLANTS          No Substance 2 days 4 days remarks   41 1 Benzyl Salicylate - -    42 2 Benzyl Cinnamate - -    43 3 Di-Limonene (Dipentene) - -    44 4 Cananga Odorata (Ozzy Preciado) (I) - -    45 5 Lichen Acid Mix - -    46 6 Mentha Piperita Oil (Peppermint Oil) - -    47 7 Sesquiterpenelactone mix - -    48 8 Tea Tree Oil, Oxidized - -    49 9 Wood Tar Mix - -    50 10 Abietic Acid - -    51 11 Lavendula Angustifolia Oil (Lavender Oil) - -    52 12 Camphor  NA NA      Fragrance Mix I      53 13 Oakmoss Absolute - -    54 14 Eugenol - -    55 15 Geraniol - -    56 16 Hydroxycitronellal - -    57 17 Isoeugenol - -    58 18 Cinnamic Aldehyde - -    59 19 Cinnamic Alcohol  - -    60 20 Sorbitane Sesquioleate - -       Fragrance mix II      61 21 Citronellol - -    62 22 Alpha-Hexylcinnamic Aldehyde    - -    63 23 Citral - -    64 24 Farnesol - -    65 25 Coumarin - -      CORTICOSTEROIDS     No Substance 2 days 4 days remarks Allergy  class   66 1 Amcinonide - -  B   67 2 Betametasone-17,21 Dipropionate - -  D1   68 3 Desoximetasone - -  C   69 4 Betamethasone-17-Valerate - -  D1   70 5 Dexamethasone - -  C   71 6 Hydrocortisone - -  A   72 7 Clobetasol-17-Propionate - -  D1   73 8 Dexamethasone-21-Phosphate Disodium Salt - -  C   74 9 Hydrocortisone-17 Butyrate - -  D2   75 10 Prednisolone - -  A   76 11 Mometason Furoate - -  D1   77 12 Triamcinolone Acetonide - -  B   78 13 Methylprednisolone Aceponate - -  D2   79 14 Hydrocortisone-21-Acetate - -  A   80 15 Prednicarbate - -  D2         EMULSIFIERS & ADDITIVES        No Substance 2 days 4 days remarks   81 1 Polyethylene Glycol-400 - -    82 2 Cocamidopropyl Betaine - -    83 3 Amerchol L101 - -    84 4 Propylene Glycol - -    85 5 Triethanolamine - -    86 6 Sorbitane Sesquiolate - -    87 7 Isopropylmyristate - -    88 8 Polysorbate 80  - -    89 9 Amidoamine   (Stearamidopropyl Dimethylamine) - -    90 10 Oleamidopropyl Dimethylamine - -    91 11 Lauryl Glucoside - -    92 12 Coconut Diethanolamide  - -    93 13 2-Hydroxy-4-Methoxy Benzophenone (Oxybenzone) - -    94 14 Benzophenone-4 (Sulisobenzon) - -    95 15 Propolis - -    96 16 Dexpanthenol - -    97 17 Carboxymethyl Cellulose Sodium NA NA    98 18 Abitol - -    99 19 Tert-Butylhydroquinone - -    100 20 Benzyl Salicylate - -      Antioxidant      101 21 Dodecyl Gallate - -    102 22 Butylhydroxyanisole (BHA) - -    103 23 Butylhydroxytoluene (BHT) - -    104 24 Di-Alpha-Tocopherol (Vit E) - -    105 25 Propyl Gallate - -    106 26 Zinc Pyrithione NA NA    107 27 Dimethylaminopropylamin (DMAPA) - -        PRESERVATIVES & ANTIMICROBIALS          No Substance 2 days 4 days remarks   108 1  1,2-Benzisothiazoline-3-One,  Sodium Salt - -    109 2  1,3,5-Mk (2-Hydroxyethyl) - Hexahydrotriazine (Grotan BK) - -    110 3 2-Vqfxtuwydrsyp-5-Nitro-1, 3-Propanediol - -    111 4  3, 4, 4' - Triclocarban - -    112 5 4 - Chloro - 3 - Cresol - -    113 6 4 - Chloro - 4 - Xylenol (PCMX) - -    114 7 7-Ethylbicyclooxazolidine (Bioban [id number]) (+) ++    115 8 Benzalkonium Chloride - -    116 9 Benzyl Alcohol - -    117 10 Cetalkonium Chloride - -    118 11 Cetylpyrimidine Chloride  - -    119 12 Chloroacetamide - -    120 13 DMDM Hydantoin - -    121 14 Glutaraldehyde - -    122 15 Triclosan - -    123 16 Glyoxal Trimeric Dihydrate - -    124 17 Iodopropynyl Butylcarbamate - -    125 18 Octylisothiazoline - -    126 19 Iodoform NA NA    127 20 (Nitrobutyl) Morpholine/(Ethylnitro-Trimethylene) Dimorpholine (Bioban P 1487) - -    128 21 Phenoxyethanol - -    129 22 Phenyl Salicylate - -    130 23 Povidone Iodine - -    131 24 Sodium Benzoate - -    132 25 Sodium Disulfite - +/++    133 26 Sorbic Acid - -    134 27 Thimerosal - -    135 28 Melamine Formeladyde Resin        Parabens      136 29 Butyl-P-Hydroxybenzoate - -    137 30 Ethyl-P-Hydroxybenzoate - -    138 31 Methyl-P-Hydroxybenzoate - -    139 32 Propyl-P-Hydroxybenzoate - -      PHOTOPATCH tests (unexposed)          No Substance 2 days 4 days remarks   140 1 3,4,4'-Triclocarban - -    141 2 Coumarin - -    142 3 Bithiniol NA NA    143 4 Usnic Acid - -    144 5 Compositae Mix - -    145 6 Wood Tar Mix (+) -    146 7 Balsam of [place] (Myroxylon Pereirae Resin) - -    147 8 Hexachlorophene - -    148 9 Chlorhexidine Digluconate - -    149 10 Triclosan - -    150 11 Fragrance Mix - -    151 12 Ketoprofen  - -    152 13 Lichen Acid Mix - -    153 14 Musk Ambrette - -      Sunscreens (UV filters)  -    154 15 P-Aminobenzoic Acid - -    155 16 2-Hydroxy-4-Methoxy Benzophenone (Oxybenzone) - -    156 17 Tert-Butyl-Methoxydibenzoyl Methane  - -    157 18 3-4-Mehyl Benzyliden Camphor  - -    158 19  2-Ethylhexyl-P (Dimethylamino) Benzoate - -    159 20 4-Ggoqblse-7-Methoxy-4-Methyl Benzophenone - -    160 21 Benzyl Salicylate - -    161 22 Isoamyl-4-Methoxycinnamate - -    162 23 Phenyl Benzimidazole - 5 - Sulfonic Acid - -    163 24 8-Jllmjxrlzwfhi-1-Hydroxybenzoyl-Benzoic Acid Hexyl Dee - -    164 25 Menthyl Anthranilate (Meradimate) - -    165 26 Bis-Ethylhexyloxyphenol-Methoxy Phenyl Triazine - -    166 27 Diethylhexyl Butamido Triazone - -    167 28 Disodium Phenyldibenzimidazole Tetrasulfonate - -    168 29 Octocrylene - -    169 30 Octyl Triazone - -    170 31 Tinsorb (Methylene - Bis - Benzotriazyl Tetramethylbutylphenol) - -      PHOTOPATCH tests (exposed)          No Substance 2 days 4 days remarks   181 1 3,4,4'-Triclocarban - -    182 2 Coumarin - -    183 3 Bithiniol NA AN    184 4 Usnic Acid - -    185 5 Compositae Mix - -    186 6 Wood Tar Mix - -    187 7 Balsam of [place] (Myroxylon Pereirae Resin) - -    188 8 Hexachlorophene - -    189 9 Chlorhexidine Digluconate - -    190 10 Triclosan - -    191 11 Fragrance Mix - -    192 12 Ketoprofen  - -    193 13 Lichen Acid Mix - -    194 14 Musk Ambrette - -      Sunscreens (UV filters)  -    195 15 P-Aminobenzoic Acid - -    196 16 2-Hydroxy-4-Methoxy Benzophenone (Oxybenzone) - -    197 17 Tert-Butyl-Methoxydibenzoyl Methane  - -    198 18 3-4-Mehyl Benzyliden Camphor  - -    199 19 2-Ethylhexyl-P (Dimethylamino) Benzoate - -    200 20 7-Aaywivve-1-Methoxy-4-Methyl Benzophenone - -    201 21 Benzyl Salicylate - -    202 22 Isoamyl-4-Methoxycinnamate - -    203 23 Phenyl Benzimidazole - 5 - Sulfonic Acid - -    204 24 2-Azqopltqgmmup-0-Hydroxybenzoyl-Benzoic Acid Hexyl Dee - -    205 25 Menthyl Anthranilate (Meradimate) - -    206 26 Bis-Ethylhexyloxyphenol-Methoxy Phenyl Triazine - -    207 27 Diethylhexyl Butamido Triazone - -    208 28 Disodium Phenyldibenzimidazole Tetrasulfonate - -    209 29 Octocrylene - -    210 30 Octyl Triazone - -    211  31 Tinsorb (Methylene - Bis - Benzotriazyl Tetramethylbutylphenol) - -      Procedure: Apply the same photopatch series 2  times on the back. Remove one patch side for irradiation after 1 day and then irradiate this site with 10 J/cm2 UVA. Remove the other, non-irradiated patch sites on day 2.   Maybe perform on these patients as well standardized UV-B and UV-A MED tests.    Results of patch tests:                         Interpretation:    - Negative                    A    = Allergic      (+) Erythema    TI   = Toxic/irritant   + E + Infiltration    RaP = Relevance at Present     ++ E/I + Papulovesicle   Rpr  = Relevance Previously     +++ E/I/P + Blister     nR   = No Relevance          Atopy Screen    No Substance Readings (15 min) Evaluation   POS Histamine 1mg/ml +/++    NEG NaCl 0.9% -      No Substance Readings (15 min) Evaluation   1 Alternaria alternata (tenuis)  -    2 Cladosporium herbarum -    3 Aspergillus fumigatus -    4 Penicillium notatum -    5 Dermatophagoides pteronyssinus -    6 Dermatophagoides farinae -    7 Dog epithelium (canis spp) -    8 Cat hair (alyssa catus) -    9 Cockroach   (Blatella americana & germanica) -    10 Grass mix midwest   (Nano, Orchard, Redtop, Rickie) -    11 [place] grass (sorghum halepense) -    12 Weed mix   (common Cocklebur, Lamb s quarters, rough redroot Pigweed, Dock/Sorrel) -    13 Mug wort (artemisia vulgare) -    14 Ragweed giant/short (ambrosia spp) -    15 English Plantain (plantago lanceolata) -    16 Tree mix 1 (Pecan, Maple BHR, Oak RVW, american [place], black [place]) -    17 Red cedar (juniperus virginia) -    18 Tree mix 2   (white Tashi, river/red Birch, black [place], common [place], american Elm) -    19 Box elder/Maple mix (acer spp) -    20 [place] shagbark (carya ovata) -       -      Conclusion: no signs for atopic predisposition in prick test      Intradermal Testing (Placed 10/19/20 )    No Substance Conc.  Readings (15min) Evaluation   -  NaCl  0.9% - 3mmP, no E   + Histamine (prick) 0.1mg / ml -    DF Standard Dust Mite - D. Farinae 1:10 ++ 10mmP&E   DP Standard Dust Mite - D. Pteronyssinus 1:10 ++ 12mmP&E   A Aspergillus fumigatus  1:10 -    P Penicillium notatum 1:10 -      Conclusion: immediate type reaction to house dust mites in IDT, not to molds and delayed type reaction to house dust mites, not molds      [] No relevant allergic reaction observed    [x] Allergic reaction diagnosed against following allergens:    Class A corticosteroid Tixocortole pivalate +++    Preservatives: 7-Ethylbicyclooxazolidine (Bioban [id number])++, Sodium Disulfite ++, Imidazolidinyl Urea +/++ and Quaternium 15 (Dowicil 200) +/++    house dust mites (immediate and delayed type reactions)      Interpretation/ remarks:   Relevant sensitizations to preservatives and also group A corticosteroid Tixocortole pivalate. This can be in any type of creams or cosmetic products.  In addition, atopic predisposition with immediate and delayed type hypersensitivity to house dust mites    [x] Patient information given   [x] ACDS CAMP information's (# [id number], and [id number]) to  following compounds: Class A  corticosteroid Tixocortole pivalate,  7-Ethylbicyclooxazolidine (Bioban  [id number]), Sodium  Disulfite,  Imidazolidinyl Urea and Quaternium 15  (Dowicil 200)        ==> final Diagnosis:    # recurrent dermatitis mostly exposed areas with proven contact sensitization to:    Various preservatives    Class A corticosteroids  and additionally atopic predisposition with:    Immediate and delayed type reaction to house dust mites in IDT = atopic dermatitis!      # excluding drug allergy with lip swelling to Amoxicillin  --> unlikely, because not first day of treatment  --> patient had several treatments with Amoxicillin before without problems?!  --> other medication?? Patient will bring the original medication box      These conclusions are made at the best of one's knowledge and belief  based on the provided evidence such as patient's history and allergy test results and they can change over time or can be incomplete because of missing information's.    ==> Patient counseling:  >> follow exactly the recommendations of the CAMP Ruben  >> info given for house dust mite reduction  >> patient should avoid ALL Corticosteroids of Class A and D2 (see list below in red) and only use Corticosteroids of Class B, C or D1.      Group Characteristics of group Generic name Name  cross reactions   A Hydrocortisone   Cloprednole, Fludrocortisone acétate, Hydrocortison acetate, Methylprednisolone, Prednisolone, Tixocortolpivalate Alfacortone, Fucidin H, Dermacalm, Hexacortone, Premandole, Imacort With group D2   B Triamcinolone-acetonide   Budenoside (R-isomer), Amcinonide, Desonide, Fluocinolone acetonide, Triamcinolone acetonide Locapred, Locatop  Synalar, Pevisone, [place] -   C Betamethasone (Without Dee)   Betamethasone, Dexamethasone, Flumethasone pivalate, Halomethasone Daivobet, Dexasalyl, Locasalen,   -   D1 Betamethasone-diproprionate   Betamethasone dipropionate, Betamethasone-17-valerate, Clobetasole-propionate, Fluticasone propionate, Mometasone furoate Betnovate, Diprogenta, Diprosalic, Diprosone, Celestoderm, Fucicort,  Cutivate, Axotide, Elocom -   D2 Methylprednisolone-aceponate   Hydrocortisone-aceponate, Hydrocortisone-buteprate, Hydrocortisone-17-butyrate, Methylprednisolone aceponate, Prednicarbate Locoïd, Advantan,  Prednitop With group A and Budesonide (S-isomer)       Follow-up: in with Dr. Tristan in [place]      Thank you for the opportunity be involved in the care of this patient.     Staff:  Valarie Gramajo & Anupama Kidd LPN      I spent a total of 36 minutes face to face with Freddy Day during today s office visit. Over 50% of this time was spent reading the patch tests, discussing all the test results, correlating them to the clinical relevance, counseling the patient and/or  coordinating care. Moreover time was spent to install and explain the CAMP Ruben. We changed also the allergy reports in [facility] Allergies. Please see Assessment and Plan for details.

## 2020-10-23 NOTE — LETTER
10/23/2020         RE: Freddy Day  [address] [facility] [address]        Dear Colleague,    Thank you for referring your patient, Freddy Day, to the [facility] [place]. Please see a copy of my visit note below.    Note for return visit for Dermato-Allergy       Encounter Date: Oct 23, 2020    History of Present Illness:  Freddy Day is a 80 year old male who presents in person to the consult following information has been take directly from the prior notes, patients informations, Epic and/or other sources and exam/history performed by myself:     Patient here for 2nd reading of patch tests and conclusions    Additional comments and observations from review of the patient s chart including the following:    See notes for more details    ROS: Patient generally feeling well today   Physical Examination:  General: Well-appearing, appropriately-developed individual.  - Skin: Focused examination of the test sites on back within the consultation was performed.  See test results   As above in HPI. No additional skin concerns.  - upper respiratory tract: currently no obvious Rhinitis  - lungs: no signs for active and present Asthma/Wheezing or coughing  - eyes: currently no active conjunctivits  - GI system/digestion: currently no problems, no bloating or Diarrhoea        Earlier History and Allergy exams:    2.5 years ago patient was on a party in [place] and got bitten on the ankle and then the rash did not disappear and then spread over the rest of legs ==> diagnosed as nummular eczema (got steroid injection and topical triamcinolone and within 3months gone)    Last December again in [place] and again insect bite on ankles and then spread over legs, arms, back/chest and face ==> dermatologist in [place] with 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 Consulted Oncologist in [place] and there all normal. Then went to Dr. Tristan and again biopsies = not lymphoma,  but one time spongiotic dermatitis.    Tried different creams, but not really improvement = has to use topical steroids daily    Patient has also chest pain and bloating --> cut out alcohol about 6 weeks ago and skin improved    Couple weeks ago back to [place] for about 1 week and 4 days later eczema started again.    Used some time ago Neosporin    Patient has since age of 52 in spring and fall rhinosinusitis.    Medications: Metoprolol, Terazosin, Pravastatin, Istolol eye drops (Glaucoma), Fiber supplement and Mirolax (Diverticulitis),   Rarely takes Ibuprofen or Tylenol    Patient since about 1 month on Tacrolimus and has some effects, but not very good.    Patient got Amoxicillin for the dermatitis in June 2020 and after 2-3 days lips were swelling up. Maybe it was not Amoxicillin, patient takes amoxicillin regularly before going to dentist and had never problems. Find out exaclty what medication was given in June and maybe remove Amoxicillin from Allergy list.    Past Medical History:   There is no problem list on file for this patient.    No past medical history on file.    Allergy History:     Allergies   Allergen Reactions     Amoxicillin Other (See Comments)     Lips swelling      Brimonidine Itching     Rosuvastatin Muscle Pain (Myalgia)     Timolol Itching       Social History:  The patient is retired. Patient has the following hobbies or non-occupational exposure      reports that he quit smoking about 30 years ago. He does not have any smokeless tobacco history on file.      Family History:  No family history on file.    Medications:  Current Outpatient Medications   Medication Sig Dispense Refill     betamethasone dipropionate (DIPROSONE) 0.05 % external cream APPLY TWICE DAILY TO ALL ITCHY SPOTS.       calcium polycarbophil (FIBERCON) 625 MG tablet Take 2 tablets by mouth 2 times daily       clobetasol (TEMOVATE) 0.05 % external solution APPLY TO SCALP 1 2 TIMES A DAY FOR ITCHY SKIN.       metoprolol  succinate ER (TOPROL-XL) 50 MG 24 hr tablet Take 50 mg by mouth       polyethylene glycol (MIRALAX) 17 GM/Dose powder Take 17 g by mouth       pravastatin (PRAVACHOL) 10 MG tablet Take 10 mg by mouth       terazosin (HYTRIN) 5 MG capsule Take 10 mg by mouth         Allergy Tests:    Past Allergy Test    Future Allergy Test: 8/31/2020     Order for PATCH TESTS    [] Outpatient  [] Inpatient: [place]..../ Bed ....      Skin Atopy (atopic dermatitis) [x] Yes   [] No  Rhinitis/Sinusitis:   [x] Yes   [] No (spring and fall) --> Sinusitis and Postnasal drip entire year. 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 DDx allergic contact dermatitis vs atopic dermatitis (nummular dermatitis)  Known previous allergies: none    Standardized panels  [x] Standard panel (40 tests)  [x] Preservatives & Antimicrobials (31 tests)  [x] Emulsifiers & Additives (25 tests)   [x] Perfumes/Flavours & Plants (25 tests)  [] Hairdresser panel (12 tests)  [] Rubber Chemicals (22 tests)  [] Plastics (26 tests)  [] Colorants/Dyes/Food additives (20 tests)  [] Metals (implants/dental) (24 tests)  [] Local anaesthetics/NSAIDs (13 tests)  [] Antibiotics & Antimycotics (14 tests)   [x] Corticosteroids (15 tests)   [x] Photopatch test (62 tests)   [] others: ...      [] Patient's own products: ...    DO NOT test if chemical or biological identity is unknown!     always ask from patient the product information and safety sheets (MSDS)   [x] Atopy screen prick test (Atopic predisposition): with IDT HDM and molds if prick negative    RESULTS & EVALUATION of PATCH TESTS    Patch test readings after     [x] 2 days, [] 3 days  [x] 4 days, [] 5 days,    Applied patch tests with results (import here the list of patch tests):     STANDARD Series         No Substance 2 days 4 days remarks   1 Juaquin Mix [C] - -    2 Colophony - -    3  2-Mercaptobenzothiazole  - -     4 Methylisothiazolinone - -    5 Carba Mix - -    6 Thiuram Mix [A] - -    7 Bisphenol A Epoxy Resin - -    8 E-Krac-Pzuayahcdlz-Formaldehyde Resin - -    9 Mercapto Mix [A] - -    10 Black Rubber Mix- PPD [B] - -    11 Potassium Dichromate  -  -    12 Balsam of [place] (Myroxylon Pereirae Resin) - -    13 Nickel Sulphate Hexahydrate - -    14 Mixed Dialkyl Thiourea - -    15 Paraben Mix [B] - -    16 Methyldibromo Glutaronitrile - -    17 Fragrance Mix - -    18 2-Bromo-2-Nitropropane-1,3-Diol (Bronopol) - -    19 Lyral - -    20 Tixocortol-21- Pivalate +/++ ++/+++    21 Diazolidiyl Urea (Germall II) - -    22 Methyl Methacrylate - -    23 Cobalt (II) Chloride Hexahydrate - -    24 Fragrance Mix II  - -    25 Compositae Mix - -    26 Benzoyl Peroxide - -    27 Bacitracin - -    28 Formaldehyde - -    29 Methylchloroisothiazolinone / Methylisothiazolinone - -    30 Corticosteroid Mix - -    31 Sodium Lauryl Sulfate - -    32 Lanolin Alcohol - -    33 Turpentine - -    34 Cetylstearylalcohol - -    35 Chlorhexidine Dicluconate - -    36 Budenoside - -    37 Imidazolidinyl Urea  - +/++    38 Ethyl-2 Cyanoacrylate - -    39 Quaternium 15 (Dowicil 200) - +/++    40 Decyl Glucoside - -      PERFUMES, FLAVORS & PLANTS          No Substance 2 days 4 days remarks   41 1 Benzyl Salicylate - -    42 2 Benzyl Cinnamate - -    43 3 Di-Limonene (Dipentene) - -    44 4 Cananga Odorata (Ylang Ylang) (I) - -    45 5 Lichen Acid Mix - -    46 6 Mentha Piperita Oil (Peppermint Oil) - -    47 7 Sesquiterpenelactone mix - -    48 8 Tea Tree Oil, Oxidized - -    49 9 Wood Tar Mix - -    50 10 Abietic Acid - -    51 11 Lavendula Angustifolia Oil (Lavender Oil) - -    52 12 Camphor  NA NA      Fragrance  Mix I      53 13 Oakmoss Absolute - -    54 14 Eugenol - -    55 15 Geraniol - -    56 16 Hydroxycitronellal - -    57 17 Isoeugenol - -    58 18 Cinnamic Aldehyde - -    59 19 Cinnamic Alcohol  - -    60 20 Sorbitane Sesquioleate - -      Fragrance mix II      61 21 Citronellol - -    62 22 Alpha-Hexylcinnamic Aldehyde    - -    63 23 Citral - -    64 24 Farnesol - -    65 25 Coumarin - -      CORTICOSTEROIDS     No Substance 2 days 4 days remarks Allergy  class   66 1 Amcinonide - -  B   67 2 Betametasone-17,21 Dipropionate - -  D1   68 3 Desoximetasone - -  C   69 4 Betamethasone-17-Valerate - -  D1   70 5 Dexamethasone - -  C   71 6 Hydrocortisone - -  A   72 7 Clobetasol-17-Propionate - -  D1   73 8 Dexamethasone-21-Phosphate Disodium Salt - -  C   74 9 Hydrocortisone-17 Butyrate - -  D2   75 10 Prednisolone - -  A   76 11 Mometason Furoate - -  D1   77 12 Triamcinolone Acetonide - -  B   78 13 Methylprednisolone Aceponate - -  D2   79 14 Hydrocortisone-21-Acetate - -  A   80 15 Prednicarbate - -  D2         EMULSIFIERS & ADDITIVES        No Substance 2 days 4 days remarks   81 1 Polyethylene Glycol-400 - -    82 2 Cocamidopropyl Betaine - -    83 3 Amerchol L101 - -    84 4 Propylene Glycol - -    85 5 Triethanolamine - -    86 6 Sorbitane Sesquiolate - -    87 7 Isopropylmyristate - -    88 8 Polysorbate 80  - -    89 9 Amidoamine   (Stearamidopropyl Dimethylamine) - -    90 10 Oleamidopropyl Dimethylamine - -    91 11 Lauryl Glucoside - -    92 12 Coconut Diethanolamide  - -    93 13 2-Hydroxy-4-Methoxy Benzophenone (Oxybenzone) - -    94 14 Benzophenone-4 (Sulisobenzon) - -    95 15 Propolis - -    96 16 Dexpanthenol - -    97 17 Carboxymethyl Cellulose Sodium NA NA    98 18 Abitol - -    99 19 Tert-Butylhydroquinone - -    100 20 Benzyl Salicylate - -      Antioxidant      101 21 Dodecyl Gallate - -    102 22 Butylhydroxyanisole (BHA) - -    103 23 Butylhydroxytoluene (BHT) - -    104 24  Di-Alpha-Tocopherol (Vit E) - -    105 25 Propyl Gallate - -    106 26 Zinc Pyrithione NA NA    107 27 Dimethylaminopropylamin (DMAPA) - -        PRESERVATIVES & ANTIMICROBIALS          No Substance 2 days 4 days remarks   108 1  1,2-Benzisothiazoline-3-One, Sodium Salt - -    109 2  1,3,5-Mk (2-Hydroxyethyl) - Hexahydrotriazine (Grotan BK) - -    110 3 0-Rtoejiqfwlirq-4-Nitro-1, 3-Propanediol - -    111 4  3, 4, 4' - Triclocarban - -    112 5 4 - Chloro - 3 - Cresol - -    113 6 4 - Chloro - 4 - Xylenol (PCMX) - -    114 7 7-Ethylbicyclooxazolidine (Bioban [id number]) (+) ++    115 8 Benzalkonium Chloride - -    116 9 Benzyl Alcohol - -    117 10 Cetalkonium Chloride - -    118 11 Cetylpyrimidine Chloride  - -    119 12 Chloroacetamide - -    120 13 DMDM Hydantoin - -    121 14 Glutaraldehyde - -    122 15 Triclosan - -    123 16 Glyoxal Trimeric Dihydrate - -    124 17 Iodopropynyl Butylcarbamate - -    125 18 Octylisothiazoline - -    126 19 Iodoform NA NA    127 20 (Nitrobutyl) Morpholine/(Ethylnitro-Trimethylene) Dimorpholine (Bioban P 1487) - -    128 21 Phenoxyethanol - -    129 22 Phenyl Salicylate - -    130 23 Povidone Iodine - -    131 24 Sodium Benzoate - -    132 25 Sodium Disulfite - +/++    133 26 Sorbic Acid - -    134 27 Thimerosal - -    135 28 Melamine Formeladyde Resin        Parabens      136 29 Butyl-P-Hydroxybenzoate - -    137 30 Ethyl-P-Hydroxybenzoate - -    138 31 Methyl-P-Hydroxybenzoate - -    139 32 Propyl-P-Hydroxybenzoate - -      PHOTOPATCH tests (unexposed)          No Substance 2 days 4 days remarks   140 1 3,4,4'-Triclocarban - -    141 2 Coumarin - -    142 3 Bithiniol NA NA    143 4 Usnic Acid - -    144 5 Compositae Mix - -    145 6 Wood Tar Mix (+) -    146 7 Balsam of [place] (Myroxylon Pereirae Resin) - -    147 8 Hexachlorophene - -    148 9 Chlorhexidine Digluconate - -    149 10 Triclosan - -    150 11 Fragrance Mix - -    151 12 Ketoprofen  - -    152 13 Lichen Acid Mix - -     153 14 Musk Ambrette - -      Sunscreens (UV filters)  -    154 15 P-Aminobenzoic Acid - -    155 16 2-Hydroxy-4-Methoxy Benzophenone (Oxybenzone) - -    156 17 Tert-Butyl-Methoxydibenzoyl Methane  - -    157 18 3-4-Mehyl Benzyliden Camphor  - -    158 19 2-Ethylhexyl-P (Dimethylamino) Benzoate - -    159 20 2-Ymmnjfkd-0-Methoxy-4-Methyl Benzophenone - -    160 21 Benzyl Salicylate - -    161 22 Isoamyl-4-Methoxycinnamate - -    162 23 Phenyl Benzimidazole - 5 - Sulfonic Acid - -    163 24 8-Luczlepqwhwgv-5-Hydroxybenzoyl-Benzoic Acid Hexyl Dee - -    164 25 Menthyl Anthranilate (Meradimate) - -    165 26 Bis-Ethylhexyloxyphenol-Methoxy Phenyl Triazine - -    166 27 Diethylhexyl Butamido Triazone - -    167 28 Disodium Phenyldibenzimidazole Tetrasulfonate - -    168 29 Octocrylene - -    169 30 Octyl Triazone - -    170 31 Tinsorb (Methylene - Bis - Benzotriazyl Tetramethylbutylphenol) - -      PHOTOPATCH tests (exposed)          No Substance 2 days 4 days remarks   181 1 3,4,4'-Triclocarban - -    182 2 Coumarin - -    183 3 Bithiniol NA AN    184 4 Usnic Acid - -    185 5 Compositae Mix - -    186 6 Wood Tar Mix - -    187 7 Balsam of [place] (Myroxylon Pereirae Resin) - -    188 8 Hexachlorophene - -    189 9 Chlorhexidine Digluconate - -    190 10 Triclosan - -    191 11 Fragrance Mix - -    192 12 Ketoprofen  - -    193 13 Lichen Acid Mix - -    194 14 Musk Ambrette - -      Sunscreens (UV filters)  -    195 15 P-Aminobenzoic Acid - -    196 16 2-Hydroxy-4-Methoxy Benzophenone (Oxybenzone) - -    197 17 Tert-Butyl-Methoxydibenzoyl Methane  - -    198 18 3-4-Mehyl Benzyliden Camphor  - -    199 19 2-Ethylhexyl-P (Dimethylamino) Benzoate - -    200 20 7-Sntgyyim-3-Methoxy-4-Methyl Benzophenone - -    201 21 Benzyl Salicylate - -    202 22 Isoamyl-4-Methoxycinnamate - -    203 23 Phenyl Benzimidazole - 5 - Sulfonic Acid - -    204 24 5-Znaoijdzuccmh-1-Hydroxybenzoyl-Benzoic Acid Hexyl Dee - -    205 25 Menthyl  Anthranilate (Meradimate) - -    206 26 Bis-Ethylhexyloxyphenol-Methoxy Phenyl Triazine - -    207 27 Diethylhexyl Butamido Triazone - -    208 28 Disodium Phenyldibenzimidazole Tetrasulfonate - -    209 29 Octocrylene - -    210 30 Octyl Triazone - -    211 31 Tinsorb (Methylene - Bis - Benzotriazyl Tetramethylbutylphenol) - -      Procedure: Apply the same photopatch series 2  times on the back. Remove one patch side for irradiation after 1 day and then irradiate this site with 10 J/cm2 UVA. Remove the other, non-irradiated patch sites on day 2.   Maybe perform on these patients as well standardized UV-B and UV-A MED tests.    Results of patch tests:                         Interpretation:    - Negative                    A    = Allergic      (+) Erythema    TI   = Toxic/irritant   + E + Infiltration    RaP = Relevance at Present     ++ E/I + Papulovesicle   Rpr  = Relevance Previously     +++ E/I/P + Blister     nR   = No Relevance          Atopy Screen    No Substance Readings (15 min) Evaluation   POS Histamine 1mg/ml +/++    NEG NaCl 0.9% -      No Substance Readings (15 min) Evaluation   1 Alternaria alternata (tenuis)  -    2 Cladosporium herbarum -    3 Aspergillus fumigatus -    4 Penicillium notatum -    5 Dermatophagoides pteronyssinus -    6 Dermatophagoides farinae -    7 Dog epithelium (canis spp) -    8 Cat hair (alyssa catus) -    9 Cockroach   (Blatella americana & germanica) -    10 Grass mix midwest   (Nano, Orchard, Redtop, Rickie) -    11 Kenrick grass (sorghum halepense) -    12 Weed mix   (common Cocklebur, Lamb s quarters, rough redroot Pigweed, Dock/Sorrel) -    13 Mug wort (artemisia vulgare) -    14 Ragweed giant/short (ambrosia spp) -    15 English Plantain (plantago lanceolata) -    16 Tree mix 1 (Pecan, Maple BHR, Oak RVW, american Lewis, black Beaumont) -    17 Red cedar (juniperus virginia) -    18 Tree mix 2   (white Tashi, river/red Birch, black Palmyra, common Kensal, american  Elm) -    19 Box elder/Maple mix (acer spp) -    20 Sardis shagbark (carya ovata) -       -      Conclusion: no signs for atopic predisposition in prick test      Intradermal Testing (Placed 10/19/20 )    No Substance Conc.  Readings (15min) Evaluation   - NaCl  0.9% - 3mmP, no E   + Histamine (prick) 0.1mg / ml -    DF Standard Dust Mite - D. Farinae 1:10 ++ 10mmP&E   DP Standard Dust Mite - D. Pteronyssinus 1:10 ++ 12mmP&E   A Aspergillus fumigatus  1:10 -    P Penicillium notatum 1:10 -      Conclusion: immediate type reaction to house dust mites in IDT, not to molds and delayed type reaction to house dust mites, not molds      [] No relevant allergic reaction observed    [x] Allergic reaction diagnosed against following allergens:    Class A corticosteroid Tixocortole pivalate +++    Preservatives: 7-Ethylbicyclooxazolidine (Bioban IC2798)++, Sodium Disulfite ++, Imidazolidinyl Urea +/++ and Quaternium 15 (Dowicil 200) +/++    house dust mites (immediate and delayed type reactions)      Interpretation/ remarks:   Relevant sensitizations to preservatives and also group A corticosteroid Tixocortole pivalate. This can be in any type of creams or cosmetic products.  In addition, atopic predisposition with immediate and delayed type hypersensitivity to house dust mites    [x] Patient information given   [x] ACDS CAMP information's (# [id number], and [id number]) to  following compounds: Class A  corticosteroid Tixocortole pivalate,  7-Ethylbicyclooxazolidine (Bioban  [id number]), Sodium  Disulfite,  Imidazolidinyl Urea and Quaternium 15  (Dowicil 200)        ==> final Diagnosis:    # recurrent dermatitis mostly exposed areas with proven contact sensitization to:    Various preservatives    Class A corticosteroids  and additionally atopic predisposition with:    Immediate and delayed type reaction to house dust mites in IDT = atopic dermatitis!      # excluding drug allergy with lip swelling to Amoxicillin  --> unlikely,  because not first day of treatment  --> patient had several treatments with Amoxicillin before without problems?!  --> other medication?? Patient will bring the original medication box      These conclusions are made at the best of one's knowledge and belief based on the provided evidence such as patient's history and allergy test results and they can change over time or can be incomplete because of missing information's.    ==> Patient counseling:  >> follow exactly the recommendations of the CAMP Ruben  >> info given for house dust mite reduction  >> patient should avoid ALL Corticosteroids of Class A and D2 (see list below in red) and only use Corticosteroids of Class B, C or D1.      Group Characteristics of group Generic name Name  cross reactions   A Hydrocortisone   Cloprednole, Fludrocortisone acétate, Hydrocortison acetate, Methylprednisolone, Prednisolone, Tixocortolpivalate Alfacortone, Fucidin H, Dermacalm, Hexacortone, Premandole, Imacort With group D2   B Triamcinolone-acetonide   Budenoside (R-isomer), Amcinonide, Desonide, Fluocinolone acetonide, Triamcinolone acetonide Locapred, Locatop  Synalar, Pevisone, Kenacort -   C Betamethasone (Without Dee)   Betamethasone, Dexamethasone, Flumethasone pivalate, Halomethasone Daivobet, Dexasalyl, Locasalen,   -   D1 Betamethasone-diproprionate   Betamethasone dipropionate, Betamethasone-17-valerate, Clobetasole-propionate, Fluticasone propionate, Mometasone furoate Betnovate, Diprogenta, Diprosalic, Diprosone, Celestoderm, Fucicort,  Cutivate, Axotide, Elocom -   D2 Methylprednisolone-aceponate   Hydrocortisone-aceponate, Hydrocortisone-buteprate, Hydrocortisone-17-butyrate, Methylprednisolone aceponate, Prednicarbate Locoïd, Advantan,  Prednitop With group A and Budesonide (S-isomer)       Follow-up: in with Dr. Tristan in [place]      Thank you for the opportunity be involved in the care of this patient.     Staff:  Valarie Gramajo & Anupama Kidd LPN      I  spent a total of 36 minutes face to face with Freddy Day during today s office visit. Over 50% of this time was spent reading the patch tests, discussing all the test results, correlating them to the clinical relevance, counseling the patient and/or coordinating care. Moreover time was spent to install and explain the CAMP Ruben. We changed also the allergy reports in Epic Allergies. Please see Assessment and Plan for details.      Chief Complaint   Patient presents with     Allergy Testing Followup     Patch testing day #5     Anupama Kidd LPN             Again, thank you for allowing me to participate in the care of your patient.        Sincerely,        Alexis Arevalo MD